# Patient Record
Sex: MALE | Race: WHITE | Employment: UNEMPLOYED | RURAL
[De-identification: names, ages, dates, MRNs, and addresses within clinical notes are randomized per-mention and may not be internally consistent; named-entity substitution may affect disease eponyms.]

---

## 2017-01-06 DIAGNOSIS — I25.709 CORONARY ARTERY DISEASE INVOLVING CORONARY BYPASS GRAFT OF NATIVE HEART WITH ANGINA PECTORIS (HCC): ICD-10-CM

## 2017-01-09 RX ORDER — ATENOLOL 50 MG/1
TABLET ORAL
Qty: 30 TAB | Refills: 4 | Status: SHIPPED | OUTPATIENT
Start: 2017-01-09 | End: 2017-06-05 | Stop reason: SDUPTHER

## 2017-01-16 ENCOUNTER — TELEPHONE (OUTPATIENT)
Dept: FAMILY MEDICINE CLINIC | Age: 62
End: 2017-01-16

## 2017-01-16 DIAGNOSIS — I25.10 CORONARY ARTERY DISEASE DUE TO LIPID RICH PLAQUE: ICD-10-CM

## 2017-01-16 DIAGNOSIS — I10 ESSENTIAL HYPERTENSION: ICD-10-CM

## 2017-01-16 DIAGNOSIS — Z11.59 NEED FOR HEPATITIS C SCREENING TEST: ICD-10-CM

## 2017-01-16 DIAGNOSIS — I25.83 CORONARY ARTERY DISEASE DUE TO LIPID RICH PLAQUE: ICD-10-CM

## 2017-01-16 DIAGNOSIS — I10 ESSENTIAL HYPERTENSION: Primary | ICD-10-CM

## 2017-01-16 DIAGNOSIS — E78.5 HYPERLIPIDEMIA, UNSPECIFIED HYPERLIPIDEMIA TYPE: ICD-10-CM

## 2017-01-16 NOTE — TELEPHONE ENCOUNTER
Patient stated someone called him and told him he needed to come in for labs. There are no labs ordered for him. Do any labs need to be ordered?   Patient has scheduled appointment on 1/18/2017

## 2017-01-24 ENCOUNTER — OFFICE VISIT (OUTPATIENT)
Dept: FAMILY MEDICINE CLINIC | Age: 62
End: 2017-01-24

## 2017-01-24 VITALS
WEIGHT: 237 LBS | DIASTOLIC BLOOD PRESSURE: 68 MMHG | HEART RATE: 59 BPM | TEMPERATURE: 97.9 F | OXYGEN SATURATION: 94 % | SYSTOLIC BLOOD PRESSURE: 134 MMHG | RESPIRATION RATE: 16 BRPM | BODY MASS INDEX: 35.1 KG/M2 | HEIGHT: 69 IN

## 2017-01-24 DIAGNOSIS — I10 ESSENTIAL HYPERTENSION: ICD-10-CM

## 2017-01-24 DIAGNOSIS — E11.9 TYPE 2 DIABETES MELLITUS WITHOUT COMPLICATION, WITHOUT LONG-TERM CURRENT USE OF INSULIN (HCC): Primary | ICD-10-CM

## 2017-01-24 DIAGNOSIS — E78.00 HYPERCHOLESTEREMIA: ICD-10-CM

## 2017-01-24 LAB
ALBUMIN UR QL STRIP: 30 MG/L
BILIRUB UR QL STRIP: NEGATIVE
CREATININE, URINE POC: 300 MG/DL
GLUCOSE UR-MCNC: NORMAL MG/DL
KETONES P FAST UR STRIP-MCNC: NEGATIVE MG/DL
MICROALBUMIN/CREAT RATIO POC: <30 MG/G
PH UR STRIP: 5.5 [PH] (ref 4.6–8)
PROT UR QL STRIP: NEGATIVE MG/DL
SP GR UR STRIP: 1.03 (ref 1–1.03)
UA UROBILINOGEN AMB POC: NORMAL (ref 0.2–1)
URINALYSIS CLARITY POC: NORMAL
URINALYSIS COLOR POC: YELLOW
URINE BLOOD POC: NORMAL
URINE LEUKOCYTES POC: NEGATIVE
URINE NITRITES POC: NEGATIVE

## 2017-01-24 RX ORDER — METFORMIN HYDROCHLORIDE 500 MG/1
1000 TABLET, EXTENDED RELEASE ORAL
Qty: 60 TAB | Refills: 5 | Status: SHIPPED | OUTPATIENT
Start: 2017-01-24 | End: 2017-01-24 | Stop reason: SDUPTHER

## 2017-01-24 RX ORDER — METFORMIN HYDROCHLORIDE 500 MG/1
500 TABLET, EXTENDED RELEASE ORAL
Qty: 60 TAB | Refills: 5 | Status: SHIPPED | OUTPATIENT
Start: 2017-01-24 | End: 2017-09-13 | Stop reason: SDUPTHER

## 2017-01-24 NOTE — PATIENT INSTRUCTIONS

## 2017-01-24 NOTE — PROGRESS NOTES
Reviewed record in preparation for visit and have necessary documentation  Pt did not bring medication to office visit for review  Opportunity was given for questions  Goals that were addressed and/or need to be completed after this appointment include   Health Maintenance Due   Topic Date Due    Hepatitis C Screening  1955    Pneumococcal 19-64 Medium Risk (1 of 1 - PPSV23) 04/20/1973    DTaP/Tdap/Td series (1 - Tdap) 04/20/1975    EYE EXAM RETINAL OR DILATED Q1  12/05/2015    INFLUENZA AGE 9 TO ADULT  08/01/2016    MICROALBUMIN Q1  11/27/2016     - check for functional glucose monitor and record keeping system yes twice weekly  Pt was given BS record log to document home readings and return to office for review  Diabetic Bundle:  LDL-82  A1C-8.5  BP- 147/72  Smoking? No  Anticoagulation medication? Yes  Eye exam dilated?  Due  Foot exam? Completed

## 2017-01-24 NOTE — MR AVS SNAPSHOT
Visit Information Date & Time Provider Department Dept. Phone Encounter #  
 1/24/2017  8:20 AM Nathen Earl  [facility] [phone] [id number] Your Appointments 5/10/2017  1:20 PM  
Any with Bob Lopez MD  
[facility] (ELIE SCHEDULING) Appt Note: 6 MO F/U  
 [address]  [address]  
  
   
 [address] [place] [address] Upcoming Health Maintenance Date Due Hepatitis C Screening 1955 DTaP/Tdap/Td series (1 - Tdap) 4/20/1975 EYE EXAM RETINAL OR DILATED Q1 12/5/2015 INFLUENZA AGE 9 TO ADULT 8/1/2016 MICROALBUMIN Q1 11/27/2016 FOOT EXAM Q1 4/14/2017 HEMOGLOBIN A1C Q6M 6/21/2017 LIPID PANEL Q1 10/14/2017 COLONOSCOPY 12/5/2024 Allergies as of 1/24/2017  Review Complete On: 1/24/2017 By: Georgie Arellano LPN Severity Noted Reaction Type Reactions Percocet [Oxycodone-acetaminophen]  11/11/2014    Swelling Current Immunizations  Never Reviewed No immunizations on file. Not reviewed this visit You Were Diagnosed With   
  
 Codes Comments Type 2 diabetes mellitus without complication, without long-term current use of insulin (HCC)    -  Primary ICD-10-CM: E11.9 ICD-9-CM: 250.00 Essential hypertension     ICD-10-CM: I10 
ICD-9-CM: 401.9 Hypercholesteremia     ICD-10-CM: E78.00 ICD-9-CM: 272.0 Vitals BP Pulse Temp Resp Height(growth percentile) Weight(growth percentile) 134/68 (BP 1 Location: Left arm, BP Patient Position: Sitting) (!) 59 97.9 °F (36.6 °C) (Oral) 16 5' 9\" (1.753 m) 237 lb (107.5 kg) SpO2 BMI Smoking Status 94% 35 kg/m2 Never Smoker Vitals History BMI and BSA Data Body Mass Index Body Surface Area 35 kg/m 2 2.29 m 2 Preferred Pharmacy Pharmacy Name Phone [address], [address] [facility]. [phone] Your Updated Medication List  
  
   
This list is accurate as of: 1/24/17  8:44 AM.  Always use your most recent med list.  
  
  
  
  
 aspirin delayed-release 81 mg tablet Take  by mouth daily. atenolol 50 mg tablet Commonly known as:  TENORMIN  
TAKE 1 TABLET EVERY EVENING. atorvastatin 20 mg tablet Commonly known as:  LIPITOR  
TAKE 1 TABLET NIGHTLY Blood-Glucose Meter monitoring kit Please dispense one Contour meter -diagnosis code 250.00- Check BS once daily. enalapril 10 mg tablet Commonly known as:  VASOTEC  
TAKE 1 TABLET TWICE A DAY  
  
 glucose blood VI test strips strip Commonly known as:  blood glucose test  
Check blood sugar once daily Lancets Misc Check BS once daily  
  
 metFORMIN  mg tablet Commonly known as:  GLUCOPHAGE XR Take 1 Tab by mouth Before breakfast and dinner. nitroglycerin 0.2 mg/hr Commonly known as:  NITRODUR  
1 Patch by TransDERmal route daily. Indications: CHRONIC STABLE ANGINA PECTORIS  
  
 pantoprazole 40 mg tablet Commonly known as:  PROTONIX Take 1 Tab by mouth daily. Indications: GASTROESOPHAGEAL REFLUX  
  
 tamsulosin 0.4 mg capsule Commonly known as:  FLOMAX Take 1 Cap by mouth daily. traMADol 50 mg tablet Commonly known as:  ULTRAM  
Take 1 Tab by mouth every eight (8) hours as needed for Pain. Max Daily Amount: 150 mg.  
  
  
  
  
Prescriptions Sent to Pharmacy Refills  
 metFORMIN ER (GLUCOPHAGE XR) 500 mg tablet 5 Sig: Take 1 Tab by mouth Before breakfast and dinner. Class: Normal  
 Pharmacy: [facility]. Ph #: [phone] Route: Oral  
  
We Performed the Following AMB POC URINALYSIS DIP STICK AUTO W/O MICRO [15249 CPT(R)] AMB POC URINE, MICROALBUMIN, SEMIQUANT (3 RESULTS) [22055 CPT(R)] Patient Instructions Learning About Diabetes Food Guidelines Your Care Instructions Meal planning is important to manage diabetes. It helps keep your blood sugar at a target level (which you set with your doctor). You don't have to eat special foods. You can eat what your family eats, including sweets once in a while. But you do have to pay attention to how often you eat and how much you eat of certain foods. You may want to work with a dietitian or a certified diabetes educator (CDE) to help you plan meals and snacks. A dietitian or CDE can also help you lose weight if that is one of your goals. What should you know about eating carbs? Managing the amount of carbohydrate (carbs) you eat is an important part of healthy meals when you have diabetes. Carbohydrate is found in many foods. · Learn which foods have carbs. And learn the amounts of carbs in different foods. ¨ Bread, cereal, pasta, and rice have about 15 grams of carbs in a serving. A serving is 1 slice of bread (1 ounce), ½ cup of cooked cereal, or 1/3 cup of cooked pasta or rice. ¨ Fruits have 15 grams of carbs in a serving. A serving is 1 small fresh fruit, such as an apple or orange; ½ of a banana; ½ cup of cooked or canned fruit; ½ cup of fruit juice; 1 cup of melon or raspberries; or 2 tablespoons of dried fruit. ¨ Milk and no-sugar-added yogurt have 15 grams of carbs in a serving. A serving is 1 cup of milk or 2/3 cup of no-sugar-added yogurt. ¨ Starchy vegetables have 15 grams of carbs in a serving. A serving is ½ cup of mashed potatoes or sweet potato; 1 cup winter squash; ½ of a small baked potato; ½ cup of cooked beans; or ½ cup cooked corn or green peas. · Learn how much carbs to eat each day and at each meal. A dietitian or CDE can teach you how to keep track of the amount of carbs you eat. This is called carbohydrate counting. · If you are not sure how to count carbohydrate grams, use the Plate Method to plan meals.  It is a good, quick way to make sure that you have a balanced meal. It also helps you spread carbs throughout the day. ¨ Divide your plate by types of foods. Put non-starchy vegetables on half the plate, meat or other protein food on one-quarter of the plate, and a grain or starchy vegetable in the final quarter of the plate. To this you can add a small piece of fruit and 1 cup of milk or yogurt, depending on how many carbs you are supposed to eat at a meal. 
· Try to eat about the same amount of carbs at each meal. Do not \"save up\" your daily allowance of carbs to eat at one meal. 
· Proteins have very little or no carbs per serving. Examples of proteins are beef, chicken, turkey, fish, eggs, tofu, cheese, cottage cheese, and peanut butter. A serving size of meat is 3 ounces, which is about the size of a deck of cards. Examples of meat substitute serving sizes (equal to 1 ounce of meat) are 1/4 cup of cottage cheese, 1 egg, 1 tablespoon of peanut butter, and ½ cup of tofu. How can you eat out and still eat healthy? · Learn to estimate the serving sizes of foods that have carbohydrate. If you measure food at home, it will be easier to estimate the amount in a serving of restaurant food. · If the meal you order has too much carbohydrate (such as potatoes, corn, or baked beans), ask to have a low-carbohydrate food instead. Ask for a salad or green vegetables. · If you use insulin, check your blood sugar before and after eating out to help you plan how much to eat in the future. · If you eat more carbohydrate at a meal than you had planned, take a walk or do other exercise. This will help lower your blood sugar. What else should you know? · Limit saturated fat, such as the fat from meat and dairy products. This is a healthy choice because people who have diabetes are at higher risk of heart disease. So choose lean cuts of meat and nonfat or low-fat dairy products. Use olive or canola oil instead of butter or shortening when cooking. · Don't skip meals. Your blood sugar may drop too low if you skip meals and take insulin or certain medicines for diabetes. · Check with your doctor before you drink alcohol. Alcohol can cause your blood sugar to drop too low. Alcohol can also cause a bad reaction if you take certain diabetes medicines. Follow-up care is a key part of your treatment and safety. Be sure to make and go to all appointments, and call your doctor if you are having problems. It's also a good idea to know your test results and keep a list of the medicines you take. Where can you learn more? Go to http://ben-shavon.info/. Enter L730 in the search box to learn more about \"Learning About Diabetes Food Guidelines. \" Current as of: May 23, 2016 Content Version: 11.1 © 3950-6318 [employer]. Care instructions adapted under license by [employer] (which disclaims liability or warranty for this information). If you have questions about a medical condition or this instruction, always ask your healthcare professional. [place] [address] any warranty or liability for your use of this information. Introducing [facility] & HEALTH SERVICES! Dear Steve Montes De Oca: Thank you for requesting a Trailer[employer] account. Our records indicate that you already have an active Trailer[employer] account. You can access your account anytime at [email or web address] Did you know that you can access your hospital and ER discharge instructions at any time in Trailerpop? You can also review all of your test results from your hospital stay or ER visit. Additional Information If you have questions, please visit the Frequently Asked Questions section of the [employer]iler[employer] website at [email or web address] Remember, Trailerpop is NOT to be used for urgent needs. For medical emergencies, dial 911. Now available from your iPhone and Android! Please provide this summary of care documentation to your next provider. Your primary care clinician is listed as Melissa Mock. If you have any questions after today's visit, please call [phone].

## 2017-01-25 LAB
ALBUMIN SERPL-MCNC: 4.4 G/DL (ref 3.6–4.8)
ALBUMIN/GLOB SERPL: 1.8 {RATIO} (ref 1.1–2.5)
ALP SERPL-CCNC: 142 IU/L (ref 39–117)
ALT SERPL-CCNC: 23 IU/L (ref 0–44)
AST SERPL-CCNC: 17 IU/L (ref 0–40)
BILIRUB SERPL-MCNC: 0.4 MG/DL (ref 0–1.2)
BUN SERPL-MCNC: 13 MG/DL (ref 8–27)
BUN/CREAT SERPL: 16 (ref 10–22)
CALCIUM SERPL-MCNC: 9.5 MG/DL (ref 8.6–10.2)
CHLORIDE SERPL-SCNC: 101 MMOL/L (ref 96–106)
CHOLEST SERPL-MCNC: 163 MG/DL (ref 100–199)
CO2 SERPL-SCNC: 24 MMOL/L (ref 18–29)
CREAT SERPL-MCNC: 0.83 MG/DL (ref 0.76–1.27)
ERYTHROCYTE [DISTWIDTH] IN BLOOD BY AUTOMATED COUNT: 14.3 % (ref 12.3–15.4)
GLOBULIN SER CALC-MCNC: 2.4 G/DL (ref 1.5–4.5)
GLUCOSE SERPL-MCNC: 174 MG/DL (ref 65–99)
HCT VFR BLD AUTO: 40.3 % (ref 37.5–51)
HCV AB S/CO SERPL IA: <0.1 S/CO RATIO (ref 0–0.9)
HDLC SERPL-MCNC: 39 MG/DL
HGB BLD-MCNC: 13.5 G/DL (ref 12.6–17.7)
LDLC SERPL CALC-MCNC: 95 MG/DL (ref 0–99)
MAGNESIUM SERPL-MCNC: 1.9 MG/DL (ref 1.6–2.3)
MCH RBC QN AUTO: 29.6 PG (ref 26.6–33)
MCHC RBC AUTO-ENTMCNC: 33.5 G/DL (ref 31.5–35.7)
MCV RBC AUTO: 88 FL (ref 79–97)
PLATELET # BLD AUTO: 221 X10E3/UL (ref 150–379)
POTASSIUM SERPL-SCNC: 4.7 MMOL/L (ref 3.5–5.2)
PROT SERPL-MCNC: 6.8 G/DL (ref 6–8.5)
RBC # BLD AUTO: 4.56 X10E6/UL (ref 4.14–5.8)
SODIUM SERPL-SCNC: 141 MMOL/L (ref 134–144)
TRIGL SERPL-MCNC: 146 MG/DL (ref 0–149)
TSH SERPL DL<=0.005 MIU/L-ACNC: 1.42 UIU/ML (ref 0.45–4.5)
VLDLC SERPL CALC-MCNC: 29 MG/DL (ref 5–40)
WBC # BLD AUTO: 9.7 X10E3/UL (ref 3.4–10.8)

## 2017-01-30 NOTE — PROGRESS NOTES
Chief Complaint   Patient presents with    Diabetes    Cholesterol Problem    Hypertension    Labs     he is a 57y.o. year old male who presents for follow up of chronic medical conditions. Patient medical history significant for CABG x 3, CAD, HLD, DM2 and HTN. He continues to take just one 500 mg metformin daily though taking it BID has been previously discussed. . BP improved. Compliance a concern. He complains of chest pain. Says this feels like a squeezing across his lower chest/upper abdomen. Patient denies SOB, N/V/D, abdominal pain, paresthesia. BP Readings from Last 3 Encounters:   01/24/17 134/68   12/21/16 152/75   11/07/16 148/76     Wt Readings from Last 3 Encounters:   01/24/17 237 lb (107.5 kg)   12/21/16 238 lb (108 kg)   11/07/16 236 lb 12.8 oz (107.4 kg)     Body mass index is 35 kg/(m^2). Pertinent Labs:   Lab Results   Component Value Date/Time    Hemoglobin A1c 8.5 12/24/2016          Lab Results   Component Value Date/Time    LDL, calculated 95 01/24/2017 09:00 AM         Patient Active Problem List   Diagnosis Code    HTN (hypertension) I10    CAD (coronary artery disease) I25.10    Hypercholesteremia E78.00    DM2 [facility]diabetes mellitus, type 2) ([facility] E11.9    S/P CABG x 3 Z95.1     Past Surgical History   Procedure Laterality Date    Pr cardiac surg procedure unlist      Hx orthopaedic       Social History     Social History    Marital status:      Spouse name: N/A    Number of children: N/A    Years of education: N/A     Occupational History    Not on file.      Social History Main Topics    Smoking status: Never Smoker    Smokeless tobacco: Never Used    Alcohol use No    Drug use: No    Sexual activity: Not on file     Other Topics Concern    Not on file     Social History Narrative     Family History   Problem Relation Age of Onset    Diabetes Mother     Heart Disease Father     Stroke Father      Current Outpatient Prescriptions   Medication Sig    metFORMIN ER (GLUCOPHAGE XR) 500 mg tablet Take 1 Tab by mouth Before breakfast and dinner.  atenolol (TENORMIN) 50 mg tablet TAKE 1 TABLET EVERY EVENING.  pantoprazole (PROTONIX) 40 mg tablet Take 1 Tab by mouth daily. Indications: GASTROESOPHAGEAL REFLUX    traMADol (ULTRAM) 50 mg tablet Take 1 Tab by mouth every eight (8) hours as needed for Pain. Max Daily Amount: 150 mg.    atorvastatin (LIPITOR) 20 mg tablet TAKE 1 TABLET NIGHTLY    nitroglycerin (NITRODUR) 0.2 mg/hr 1 Patch by TransDERmal route daily. Indications: CHRONIC STABLE ANGINA PECTORIS    enalapril (VASOTEC) 10 mg tablet TAKE 1 TABLET TWICE A DAY    aspirin delayed-release 81 mg tablet Take  by mouth daily.  tamsulosin (FLOMAX) 0.4 mg capsule Take 1 Cap by mouth daily.  glucose blood VI test strips (BLOOD GLUCOSE TEST) strip Check blood sugar once daily    Blood-Glucose Meter monitoring kit Please dispense one Contour meter -diagnosis code 250.00- Check BS once daily.  Lancets misc Check BS once daily     No current facility-administered medications for this visit.       Allergies   Allergen Reactions    Percocet [Oxycodone-Acetaminophen] Swelling       Review of Symptoms:  Constitutional: Negative for fatigue, malaise  HEENT: Negative for acute hearing or vision changes  Resp: Negative for cough, wheezing or SOB  Cardiovascular: Positive for chest pain and dizziness   Abdomen: Negative for N/V/D/C or melena  Genital/Urinary: Negative for dysuria, positive for urinary frequency  Musculoskeltal: Negative for myalgias or arthralgias   Neurological: Negative for weakness or paresthesia  Psychological: Negative for depression or anxiety     Vitals:    01/24/17 0815 01/24/17 0820   BP: 147/72 134/68   Pulse: 61 (!) 59   Resp: 16    Temp: 97.9 °F (36.6 °C)    TempSrc: Oral    SpO2: 94%    Weight: 237 lb (107.5 kg)    Height: 5' 9\" (1.753 m)      Physical Examination:  General: obese, in no acute distress  Skin: normal tone and turgor  Head: Normocephalic, atraumatic  Eyes: Sclera clear, EOMI  Neck: Normal range of motion  Respiratory: CTAB with symmetrical, unlabored effort  CV: Normal S1, S2. Regular rate and rhythm, no murmur  Abdomen: obese, normal bowel sounds  Extremities: Full range of motion, no edema  Neurology: Normal strength, no focal deficits  Psych: Active, alert and oriented. Affect appropriate     Health Maintenance Due   Topic Date Due    DTaP/Tdap/Td series (1 - Tdap) 04/20/1975    EYE EXAM RETINAL OR DILATED Q1  12/05/2015    INFLUENZA AGE 9 TO ADULT  08/01/2016     Risk, benefits and potential costs of recommended health maintenance discussed. Patient expressed understanding and declined at this time. Yifan Sethi was seen today for diabetes, cholesterol problem, hypertension and labs. Diagnoses and all orders for this visit:    Type 2 diabetes mellitus without complication, without long-term current use of insulin (HCC)  -     Discontinue: metFORMIN ER (GLUCOPHAGE XR) 500 mg tablet; Take 2 Tabs by mouth Before breakfast and dinner.  -     AMB POC URINALYSIS DIP STICK AUTO W/O MICRO  -     AMB POC URINE, MICROALBUMIN, SEMIQUANT (3 RESULTS)  -     metFORMIN ER (GLUCOPHAGE XR) 500 mg tablet; Take 1 Tab by mouth Before breakfast and dinner. Essential hypertension    Hypercholesteremia      Importance of compliance with all prescribed medications discussed. Discussed expected course and possible complications of diagnoses in detail with patient. Goals of treatment were discussed. All of the patient's questions were addressed. The patient expresses understanding and agreement with our plan of care. The patient has received an after-visit summary and questions were answered concerning future plans. I have discussed medication risks and benefits with the patient as well. Follow-up Disposition:  Return in about 3 months (around 4/24/2017), or if symptoms worsen or fail to improve.

## 2017-01-31 DIAGNOSIS — E11.9 TYPE 2 DIABETES MELLITUS WITHOUT COMPLICATION (HCC): ICD-10-CM

## 2017-01-31 DIAGNOSIS — I10 ESSENTIAL HYPERTENSION: ICD-10-CM

## 2017-01-31 RX ORDER — ENALAPRIL MALEATE 10 MG/1
TABLET ORAL
Qty: 60 TAB | Refills: 3 | Status: SHIPPED | OUTPATIENT
Start: 2017-01-31 | End: 2017-06-05 | Stop reason: SDUPTHER

## 2017-05-10 ENCOUNTER — OFFICE VISIT (OUTPATIENT)
Dept: CARDIOLOGY CLINIC | Age: 62
End: 2017-05-10

## 2017-05-10 VITALS
SYSTOLIC BLOOD PRESSURE: 133 MMHG | BODY MASS INDEX: 37.47 KG/M2 | HEART RATE: 64 BPM | DIASTOLIC BLOOD PRESSURE: 77 MMHG | RESPIRATION RATE: 14 BRPM | WEIGHT: 253 LBS | TEMPERATURE: 98 F | OXYGEN SATURATION: 96 % | HEIGHT: 69 IN

## 2017-05-10 DIAGNOSIS — E78.00 HYPERCHOLESTEREMIA: ICD-10-CM

## 2017-05-10 DIAGNOSIS — Z95.1 S/P CABG X 3: ICD-10-CM

## 2017-05-10 DIAGNOSIS — I25.708 CORONARY ARTERY DISEASE OF BYPASS GRAFT OF NATIVE HEART WITH STABLE ANGINA PECTORIS (HCC): Primary | ICD-10-CM

## 2017-05-10 DIAGNOSIS — I10 ESSENTIAL HYPERTENSION: ICD-10-CM

## 2017-05-10 NOTE — PROGRESS NOTES
JOSE A MARKER      1955   Taras Truong MD  Date of Sajtg-9-87-17    [facility],  PCP=Dennis Gordon Buerger, MD     [facility]. HPI:   Melissa Ornelas is a 60 y.o. male   Subjective:  Mr. Esvin Golden returns today, he was seen in November, had chest pain. We suggested NitroDur. He tried it for a bit and then got better. He says the pills helped him. He's not been taking any for the last three months. He has no chest pain or shortness of breath. Prior history of bypass surgery . Physical Examination:  Unremarkable. Assessment/Plans:  1. Coronary artery disease of bypass graft of native heart with stable angina pectoris ([address]    2. Essential hypertension    3. S/P CABG x 3    4. Hypercholesteremia      Impression/Plan:  Chest pain. Chest pain is now resolved. Continue current medical therapy. Follow up in one year. No future appointments. Cardiac History:   CABG 11-12-14= LIMA to LAD, SVG to OM1,OM2    TTE 11/5/14 LVEF 55 % to 60 %. No WMA. .    Cath 11/11/14 RCA patent stent, mLCX 80, OM1 mid 80 LAD- 85 tubular at ostium EF60%  There was significant 2-vessel coronary artery disease (LAD and  circumflex). Ostial LAD: There was a tubular 85 % stenosis. Mid LAD: There was a discrete 80 % stenosis. Distal LAD: Angiography showed mild atherosclerosis. 1st obtuse marginal: There was a discrete 80 % stenosis at the ostium of  the vessel segment. 2nd obtuse marginal: There was a discrete 50 %  stenosis at the ostium of the vessel segment. Left AV groove artery: There  was a tubular 80 % stenosis at the ostium of the vessel segment. Right PDA: Angiography showed moderate atherosclerosis. CARDIAC STRUCTURES:  Global left ventricular function was normal. EF estimated was 60 %. ROS:Cardiac complete  as above. Respiratory as above with no wheezing or hemoptysis.    He denies  symptoms of unusual weight loss , fevers,  BRBPR, hematuria,  or recent stroke    Past Medical History:   Diagnosis Date    CAD (coronary artery disease)     stent to RCA [place];CABG 11-12-14    DM2 (diabetes mellitus, type 2) ([facility]     Hypercholesteremia     Hypertension       Exam and Labs:  Visit Vitals    /77    Pulse 64    Temp 98 °F (36.7 °C) (Oral)    Resp 14    Ht 5' 9\" (1.753 m)    Wt 253 lb (114.8 kg)    SpO2 96%    BMI 37.36 kg/m2     Constitutional:  NAD, comfortable , moist mucous membranesHENT: Head: NC,ATEyes: No scleral icterus. Neck:  Neck supple. No JVD present. No tracheal deviation,mass  Chest: Effort normal & normal respiratory excursion     Lungs:breath sounds normal. No stridor. distress, wheezes or  Rales. Heart:normal rate, regular rhythm, normal S1, S2, no murmurs, rubs, clicks or gallops , PMI non displaced. Edema: Edema is none. Extremities:  no clubbing or cyanosis. Abdominal:  no abnormal distension. Neurological: alert, conversant and oriented . Skin: Skin is not cold. No obvious systemic rash noted. Not diaphoretic. No erythema. Psychiatric:  Grossly normal mood and affect.   Behavior appears normal.     Lab Results   Component Value Date/Time    Cholesterol, total 163 01/24/2017 09:00 AM    HDL Cholesterol 39 01/24/2017 09:00 AM    LDL, calculated 95 01/24/2017 09:00 AM    Triglyceride 146 01/24/2017 09:00 AM     Lab Results   Component Value Date/Time    Sodium 141 01/24/2017 09:00 AM    Potassium 4.7 01/24/2017 09:00 AM    Chloride 101 01/24/2017 09:00 AM    CO2 24 01/24/2017 09:00 AM    Anion gap 3 11/16/2014 04:07 AM    Glucose 174 01/24/2017 09:00 AM    BUN 13 01/24/2017 09:00 AM    Creatinine 0.83 01/24/2017 09:00 AM    BUN/Creatinine ratio 16 01/24/2017 09:00 AM    GFR est  01/24/2017 09:00 AM    GFR est non-AA 94 01/24/2017 09:00 AM    Calcium 9.5 01/24/2017 09:00 AM      Wt Readings from Last 3 Encounters:   05/10/17 253 lb (114.8 kg)   01/24/17 237 lb (107.5 kg)   12/21/16 238 lb (108 kg)      BP Readings from Last 3 Encounters:   05/10/17 133/77   01/24/17 134/68   12/21/16 152/75        Current Outpatient Prescriptions   Medication Sig    enalapril (VASOTEC) 10 mg tablet TAKE 1 TABLET TWICE A DAY    metFORMIN ER (GLUCOPHAGE XR) 500 mg tablet Take 1 Tab by mouth Before breakfast and dinner.  atenolol (TENORMIN) 50 mg tablet TAKE 1 TABLET EVERY EVENING.  pantoprazole (PROTONIX) 40 mg tablet Take 1 Tab by mouth daily. Indications: GASTROESOPHAGEAL REFLUX    traMADol (ULTRAM) 50 mg tablet Take 1 Tab by mouth every eight (8) hours as needed for Pain. Max Daily Amount: 150 mg.    atorvastatin (LIPITOR) 20 mg tablet TAKE 1 TABLET NIGHTLY    nitroglycerin (NITRODUR) 0.2 mg/hr 1 Patch by TransDERmal route daily. Indications: CHRONIC STABLE ANGINA PECTORIS    aspirin delayed-release 81 mg tablet Take  by mouth daily.  tamsulosin (FLOMAX) 0.4 mg capsule Take 1 Cap by mouth daily.  glucose blood VI test strips (BLOOD GLUCOSE TEST) strip Check blood sugar once daily    Blood-Glucose Meter monitoring kit Please dispense one Contour meter -diagnosis code 250.00- Check BS once daily.  Lancets misc Check BS once daily     No current facility-administered medications for this visit. Past Surgical History:   Procedure Laterality Date    CARDIAC SURG PROCEDURE UNLIST      HX ORTHOPAEDIC        Social Hx=  reports that he has never smoked. He has never used smokeless tobacco. He reports that he does not drink alcohol or use illicit drugs. Family Hx= family history includes Diabetes in his mother; Heart Disease in his father; Stroke in his father. Impression see above.

## 2017-05-10 NOTE — PROGRESS NOTES
Reviewed record in preparation for visit and have necessary documentation      Body mass index is 37.36 kg/(m^2).     Health Maintenance Due   Topic Date Due    DTaP/Tdap/Td series (1 - Tdap) 04/20/1975    EYE EXAM RETINAL OR DILATED Q1  12/05/2015    FOOT EXAM Q1  04/14/2017

## 2017-06-05 DIAGNOSIS — I10 ESSENTIAL HYPERTENSION: ICD-10-CM

## 2017-06-05 DIAGNOSIS — I25.709 CORONARY ARTERY DISEASE INVOLVING CORONARY BYPASS GRAFT OF NATIVE HEART WITH ANGINA PECTORIS (HCC): ICD-10-CM

## 2017-06-05 DIAGNOSIS — E11.9 TYPE 2 DIABETES MELLITUS WITHOUT COMPLICATION (HCC): ICD-10-CM

## 2017-06-05 DIAGNOSIS — E78.00 HYPERCHOLESTEREMIA: ICD-10-CM

## 2017-06-05 RX ORDER — ENALAPRIL MALEATE 10 MG/1
TABLET ORAL
Qty: 60 TAB | Refills: 11 | Status: SHIPPED | OUTPATIENT
Start: 2017-06-05 | End: 2018-06-04 | Stop reason: SDUPTHER

## 2017-06-05 RX ORDER — ATENOLOL 50 MG/1
TABLET ORAL
Qty: 30 TAB | Refills: 11 | Status: SHIPPED | OUTPATIENT
Start: 2017-06-05 | End: 2018-05-24 | Stop reason: SDUPTHER

## 2017-06-05 NOTE — TELEPHONE ENCOUNTER
Requested Prescriptions     Signed Prescriptions Disp Refills    enalapril (VASOTEC) 10 mg tablet 60 Tab 11     Sig: TAKE 1 TABLET TWICE A DAY     Authorizing Provider: Derek Nance     Ordering User: HAN MILIAN    atenolol (TENORMIN) 50 mg tablet 30 Tab 11     Sig: TAKE 1 TABLET EVERY EVENING. Authorizing Provider: Derek Lopez User: Inez Wick     Verbal order per Dr. Charles Banerjee.      To schedule follow up in 1 year.

## 2017-06-07 DIAGNOSIS — E78.00 HYPERCHOLESTEREMIA: ICD-10-CM

## 2017-06-07 RX ORDER — ATORVASTATIN CALCIUM 20 MG/1
TABLET, FILM COATED ORAL
Qty: 30 TAB | Refills: 0 | Status: SHIPPED | OUTPATIENT
Start: 2017-06-07 | End: 2017-06-07 | Stop reason: SDUPTHER

## 2017-06-07 RX ORDER — ATORVASTATIN CALCIUM 20 MG/1
TABLET, FILM COATED ORAL
Qty: 30 TAB | Refills: 12 | Status: SHIPPED | OUTPATIENT
Start: 2017-06-07 | End: 2017-06-29 | Stop reason: SDUPTHER

## 2017-06-29 DIAGNOSIS — E78.00 HYPERCHOLESTEREMIA: ICD-10-CM

## 2017-06-29 RX ORDER — ATORVASTATIN CALCIUM 20 MG/1
TABLET, FILM COATED ORAL
Qty: 30 TAB | Refills: 3 | Status: SHIPPED | OUTPATIENT
Start: 2017-06-29 | End: 2017-11-21 | Stop reason: SDUPTHER

## 2017-06-29 NOTE — TELEPHONE ENCOUNTER
Refill request received from Simone Madsen, on HIPAA. Last office visit was 1/24/17 and has one scheduled for 7/11/17. He will run out before his appointment. Crystal can be reached at [phone].

## 2017-07-11 ENCOUNTER — OFFICE VISIT (OUTPATIENT)
Dept: FAMILY MEDICINE CLINIC | Age: 62
End: 2017-07-11

## 2017-07-11 VITALS
SYSTOLIC BLOOD PRESSURE: 145 MMHG | BODY MASS INDEX: 34.66 KG/M2 | TEMPERATURE: 98 F | OXYGEN SATURATION: 95 % | RESPIRATION RATE: 20 BRPM | HEIGHT: 69 IN | WEIGHT: 234 LBS | DIASTOLIC BLOOD PRESSURE: 77 MMHG | HEART RATE: 68 BPM

## 2017-07-11 DIAGNOSIS — I25.708 CORONARY ARTERY DISEASE OF BYPASS GRAFT OF NATIVE HEART WITH STABLE ANGINA PECTORIS (HCC): ICD-10-CM

## 2017-07-11 DIAGNOSIS — E11.9 TYPE 2 DIABETES MELLITUS WITHOUT COMPLICATION, WITHOUT LONG-TERM CURRENT USE OF INSULIN (HCC): Primary | ICD-10-CM

## 2017-07-11 DIAGNOSIS — I10 ESSENTIAL HYPERTENSION: ICD-10-CM

## 2017-07-11 DIAGNOSIS — E78.00 HYPERCHOLESTEREMIA: ICD-10-CM

## 2017-07-11 LAB — HBA1C MFR BLD HPLC: 8.1 %

## 2017-07-11 NOTE — MR AVS SNAPSHOT
Visit Information Date & Time Provider Department Dept. Phone Encounter #  
 7/11/2017  8:00 AM Yeny Daigle MD [address] [id number] Follow-up Instructions Return in about 4 months (around 11/11/2017). Upcoming Health Maintenance Date Due DTaP/Tdap/Td series (1 - Tdap) 4/20/1975 EYE EXAM RETINAL OR DILATED Q1 12/5/2015 FOOT EXAM Q1 4/14/2017 HEMOGLOBIN A1C Q6M 6/21/2017 INFLUENZA AGE 9 TO ADULT 8/1/2017 MICROALBUMIN Q1 1/24/2018 LIPID PANEL Q1 1/24/2018 COLONOSCOPY 12/5/2024 Allergies as of 7/11/2017  Review Complete On: 7/11/2017 By: Yeny Daigle MD  
  
 Severity Noted Reaction Type Reactions Percocet [Oxycodone-acetaminophen]  11/11/2014    Swelling Current Immunizations  Never Reviewed No immunizations on file. Not reviewed this visit You Were Diagnosed With   
  
 Codes Comments Type 2 diabetes mellitus without complication, without long-term current use of insulin ([facility])    -  Primary ICD-10-CM: E11.9 ICD-9-CM: 250.00 Essential hypertension     ICD-10-CM: I10 
ICD-9-CM: 401.9 Hypercholesteremia     ICD-10-CM: E78.00 ICD-9-CM: 272.0 Coronary artery disease of bypass graft of native heart with stable angina pectoris ([facility]     ICD-10-CM: I25.708 ICD-9-CM: 414.05, 413.9 Vitals BP Pulse Temp Resp Height(growth percentile) Weight(growth percentile) 145/77 68 98 °F (36.7 °C) (Oral) 20 5' 9\" (1.753 m) 234 lb (106.1 kg) SpO2 BMI Smoking Status 95% 34.56 kg/m2 Never Smoker Vitals History BMI and BSA Data Body Mass Index Body Surface Area 34.56 kg/m 2 2.27 m 2 Preferred Pharmacy Pharmacy Name Phone [address], [address] [facility]. [phone] Your Updated Medication List  
  
   
This list is accurate as of: 7/11/17  8:43 AM.  Always use your most recent med list.  
  
  
  
  
 aspirin delayed-release 81 mg tablet Take  by mouth daily. atenolol 50 mg tablet Commonly known as:  TENORMIN  
TAKE 1 TABLET EVERY EVENING. atorvastatin 20 mg tablet Commonly known as:  LIPITOR  
TAKE 1 TABLET NIGHTLY Blood-Glucose Meter monitoring kit Please dispense one Contour meter -diagnosis code 250.00- Check BS once daily. enalapril 10 mg tablet Commonly known as:  VASOTEC  
TAKE 1 TABLET TWICE A DAY  
  
 glucose blood VI test strips strip Commonly known as:  blood glucose test  
Check blood sugar once daily Lancets Misc Check BS once daily  
  
 metFORMIN  mg tablet Commonly known as:  GLUCOPHAGE XR Take 1 Tab by mouth Before breakfast and dinner. nitroglycerin 0.2 mg/hr Commonly known as:  NITRODUR  
1 Patch by TransDERmal route daily. Indications: CHRONIC STABLE ANGINA PECTORIS  
  
 pantoprazole 40 mg tablet Commonly known as:  PROTONIX Take 1 Tab by mouth daily. Indications: GASTROESOPHAGEAL REFLUX  
  
 traMADol 50 mg tablet Commonly known as:  ULTRAM  
Take 1 Tab by mouth every eight (8) hours as needed for Pain. Max Daily Amount: 150 mg. We Performed the Following HEMOGLOBIN A1C WITH EAG [19995 CPT(R)] HGB & HCT [85522 CPT(R)] LIPID PANEL [24205 CPT(R)] METABOLIC PANEL, COMPREHENSIVE [52501 CPT(R)] TSH 3RD GENERATION [81234 CPT(R)] Follow-up Instructions Return in about 4 months (around 11/11/2017). Patient Instructions Learning About Diabetes Food Guidelines Your Care Instructions Meal planning is important to manage diabetes. It helps keep your blood sugar at a target level (which you set with your doctor). You don't have to eat special foods. You can eat what your family eats, including sweets once in a while. But you do have to pay attention to how often you eat and how much you eat of certain foods. You may want to work with a dietitian or a certified diabetes educator (CDE) to help you plan meals and snacks. A dietitian or CDE can also help you lose weight if that is one of your goals. What should you know about eating carbs? Managing the amount of carbohydrate (carbs) you eat is an important part of healthy meals when you have diabetes. Carbohydrate is found in many foods. · Learn which foods have carbs. And learn the amounts of carbs in different foods. ¨ Bread, cereal, pasta, and rice have about 15 grams of carbs in a serving. A serving is 1 slice of bread (1 ounce), ½ cup of cooked cereal, or 1/3 cup of cooked pasta or rice. ¨ Fruits have 15 grams of carbs in a serving. A serving is 1 small fresh fruit, such as an apple or orange; ½ of a banana; ½ cup of cooked or canned fruit; ½ cup of fruit juice; 1 cup of melon or raspberries; or 2 tablespoons of dried fruit. ¨ Milk and no-sugar-added yogurt have 15 grams of carbs in a serving. A serving is 1 cup of milk or 2/3 cup of no-sugar-added yogurt. ¨ Starchy vegetables have 15 grams of carbs in a serving. A serving is ½ cup of mashed potatoes or sweet potato; 1 cup winter squash; ½ of a small baked potato; ½ cup of cooked beans; or ½ cup cooked corn or green peas. · Learn how much carbs to eat each day and at each meal. A dietitian or CDE can teach you how to keep track of the amount of carbs you eat. This is called carbohydrate counting. · If you are not sure how to count carbohydrate grams, use the Plate Method to plan meals. It is a good, quick way to make sure that you have a balanced meal. It also helps you spread carbs throughout the day. ¨ Divide your plate by types of foods. Put non-starchy vegetables on half the plate, meat or other protein food on one-quarter of the plate, and a grain or starchy vegetable in the final quarter of the plate.  To this you can add a small piece of fruit and 1 cup of milk or yogurt, depending on how many carbs you are supposed to eat at a meal. 
· Try to eat about the same amount of carbs at each meal. Do not \"save up\" your daily allowance of carbs to eat at one meal. 
· Proteins have very little or no carbs per serving. Examples of proteins are beef, chicken, turkey, fish, eggs, tofu, cheese, cottage cheese, and peanut butter. A serving size of meat is 3 ounces, which is about the size of a deck of cards. Examples of meat substitute serving sizes (equal to 1 ounce of meat) are 1/4 cup of cottage cheese, 1 egg, 1 tablespoon of peanut butter, and ½ cup of tofu. How can you eat out and still eat healthy? · Learn to estimate the serving sizes of foods that have carbohydrate. If you measure food at home, it will be easier to estimate the amount in a serving of restaurant food. · If the meal you order has too much carbohydrate (such as potatoes, corn, or baked beans), ask to have a low-carbohydrate food instead. Ask for a salad or green vegetables. · If you use insulin, check your blood sugar before and after eating out to help you plan how much to eat in the future. · If you eat more carbohydrate at a meal than you had planned, take a walk or do other exercise. This will help lower your blood sugar. What else should you know? · Limit saturated fat, such as the fat from meat and dairy products. This is a healthy choice because people who have diabetes are at higher risk of heart disease. So choose lean cuts of meat and nonfat or low-fat dairy products. Use olive or canola oil instead of butter or shortening when cooking. · Don't skip meals. Your blood sugar may drop too low if you skip meals and take insulin or certain medicines for diabetes. · Check with your doctor before you drink alcohol. Alcohol can cause your blood sugar to drop too low. Alcohol can also cause a bad reaction if you take certain diabetes medicines. Follow-up care is a key part of your treatment and safety.  Be sure to make and go to all appointments, and call your doctor if you are having problems. It's also a good idea to know your test results and keep a list of the medicines you take. Where can you learn more? Go to http://ben-shavon.info/. Enter J034 in the search box to learn more about \"Learning About Diabetes Food Guidelines. \" Current as of: March 13, 2017 Content Version: 11.3 © 0519-3470 [employer]. Care instructions adapted under license by [employer] (which disclaims liability or warranty for this information). If you have questions about a medical condition or this instruction, always ask your healthcare professional. [address] any warranty or liability for your use of this information. Introducing [facility]! Dear Tennille Singh: Thank you for requesting a Reward[employer] account. Our records indicate that you already have an active Reward[employer] account. You can access your account anytime at [email or web address]. [employer] Did you know that you can access your hospital and ER discharge instructions at any time in Reward[employer]? You can also review all of your test results from your hospital stay or ER visit. Additional Information If you have questions, please visit the Frequently Asked Questions section of the Rew[employer] website at [email or web address] Remember, [employer] is NOT to be used for urgent needs. For medical emergencies, dial 911. Now available from your iPhone and Android! Please provide this summary of care documentation to your next provider. Your primary care clinician is listed as Lenora Marie. If you have any questions after today's visit, please call [phone].

## 2017-07-11 NOTE — PATIENT INSTRUCTIONS

## 2017-07-11 NOTE — PROGRESS NOTES
Chief Complaint   Patient presents with    Hypertension    Diabetes    Cholesterol Problem    Labs    Annual Wellness Visit     he is a 60y.o. year old male who presents for follow up of chronic medical conditions. Patient medical history significant for CABG x 3, CAD, HLD, DM2 and HTN. Medication compliance a concern. Patient denies HA, dizziness, SOB, CP, abdominal pain, dysuria, myalgias or arthralgias. Diabetes: This patient is being treating under a comprehensive plan of care for diabetes. Overall the patient feels well with good energy level. Insulin dependence: no   Pertinent Labs:     Lab Results   Component Value Date/Time    Hemoglobin A1c 8.1 10/14/2016 08:58 AM      Body mass index is 34.56 kg/(m^2). Lab Results   Component Value Date/Time    LDL, calculated 95 01/24/2017 09:00 AM           Wt Readings from Last 3 Encounters:   07/11/17 234 lb (106.1 kg)   05/10/17 253 lb (114.8 kg)   01/24/17 237 lb (107.5 kg)        History   Smoking Status    Never Smoker   Smokeless Tobacco    Never Used        Medications, diet and exercise as means of diabetic control with a goal of an A1C of less than 7.0% discussed. Diabetic foot care and annual eye exam discussed as well. Check blood sugars while fasting just before breakfast on most days and occasionally before dinner. Write down readings in a diabetic log book and bring them to the next visit. Call the office for fasting sugars over 200 or below 75 on two or more occasions. Call immediately if having symptoms of high sugar (frequent urination, always thirsty) or low sugar (dizzy, lethargic, sweaty, nauseated, headache). Our overall goal is to reduce or eliminate the long term consequences of poorly controlled diabetes. Patient expresses understanding and agreement with our plan of care.     Hypertension:  The patient reports:  taking medications as instructed, no medication side effects noted, no TIA's, no chest pain on exertion, no dyspnea on exertion, no swelling of ankles. BP Readings from Last 3 Encounters:   07/11/17 145/77   05/10/17 133/77   01/24/17 134/68     Lab Results   Component Value Date/Time    Sodium 141 01/24/2017 09:00 AM    Potassium 4.7 01/24/2017 09:00 AM    Chloride 101 01/24/2017 09:00 AM    CO2 24 01/24/2017 09:00 AM    Anion gap 3 11/16/2014 04:07 AM    Glucose 174 01/24/2017 09:00 AM    BUN 13 01/24/2017 09:00 AM    Creatinine 0.83 01/24/2017 09:00 AM    BUN/Creatinine ratio 16 01/24/2017 09:00 AM    GFR est  01/24/2017 09:00 AM    GFR est non-AA 94 01/24/2017 09:00 AM    Calcium 9.5 01/24/2017 09:00 AM     Patient advised to log blood pressures at home 2-3 times weekly and bring to next visit. Call office as soon as possible if BP's over 140/90 on multiple occasions or with symptoms of dizziness, chest pain, shortness of breath, headache or ankle swelling. Our goal is to normalize the blood pressure to decrease the risks of strokes and heart attacks. The patient is in agreement with the plan. Patient Active Problem List   Diagnosis Code    HTN (hypertension) I10    CAD (coronary artery disease) I25.10    Hypercholesteremia E78.00    DM2 (diabetes mellitus, type 2) ([facility] E11.9    S/P CABG x 3 Z95.1    Type 2 diabetes mellitus without complication ([facility] A67.3     Past Surgical History:   Procedure Laterality Date    CARDIAC SURG PROCEDURE UNLIST      HX ORTHOPAEDIC       Social History     Social History    Marital status:      Spouse name: N/A    Number of children: N/A    Years of education: N/A     Occupational History    Not on file.      Social History Main Topics    Smoking status: Never Smoker    Smokeless tobacco: Never Used    Alcohol use No    Drug use: No    Sexual activity: Not on file     Other Topics Concern    Not on file     Social History Narrative     Family History   Problem Relation Age of Onset    Diabetes Mother     Heart Disease Father     Stroke Father      Current Outpatient Prescriptions   Medication Sig    atorvastatin (LIPITOR) 20 mg tablet TAKE 1 TABLET NIGHTLY    enalapril (VASOTEC) 10 mg tablet TAKE 1 TABLET TWICE A DAY    atenolol (TENORMIN) 50 mg tablet TAKE 1 TABLET EVERY EVENING.  metFORMIN ER (GLUCOPHAGE XR) 500 mg tablet Take 1 Tab by mouth Before breakfast and dinner.  pantoprazole (PROTONIX) 40 mg tablet Take 1 Tab by mouth daily. Indications: GASTROESOPHAGEAL REFLUX    nitroglycerin (NITRODUR) 0.2 mg/hr 1 Patch by TransDERmal route daily. Indications: CHRONIC STABLE ANGINA PECTORIS    aspirin delayed-release 81 mg tablet Take  by mouth daily.  glucose blood VI test strips (BLOOD GLUCOSE TEST) strip Check blood sugar once daily    Blood-Glucose Meter monitoring kit Please dispense one Contour meter -diagnosis code 250.00- Check BS once daily.  Lancets misc Check BS once daily    traMADol (ULTRAM) 50 mg tablet Take 1 Tab by mouth every eight (8) hours as needed for Pain. Max Daily Amount: 150 mg. No current facility-administered medications for this visit.       Allergies   Allergen Reactions    Percocet [Oxycodone-Acetaminophen] Swelling       Review of Symptoms:  Constitutional: Negative for fatigue, malaise  HEENT: Negative for acute hearing or vision changes  Resp: Negative for cough, wheezing or SOB  Cardiovascular: Positive for chest pain and dizziness   Abdomen: Negative for N/V/D/C or melena  Genital/Urinary: Negative for dysuria, positive for urinary frequency  Musculoskeltal: Negative for myalgias or arthralgias   Neurological: Negative for weakness or paresthesia  Psychological: Negative for depression or anxiety     Vitals:    07/11/17 0808   BP: 145/77   Pulse: 68   Resp: 20   Temp: 98 °F (36.7 °C)   TempSrc: Oral   SpO2: 95%   Weight: 234 lb (106.1 kg)   Height: 5' 9\" (1.753 m)     Physical Examination:  General: obese, in no acute distress  Skin: normal tone and turgor  Head: Normocephalic, atraumatic  Eyes: Sclera clear, EOMI  Neck: Normal range of motion  Respiratory: CTAB with symmetrical, unlabored effort  CV: Normal S1, S2. Regular rate and rhythm, no murmur  Abdomen: obese, normal bowel sounds  Extremities: Full range of motion, no edema  Feet: normal sensation, no lesions  Neurology: Normal strength, no focal deficits  Psych: Active, alert and oriented. Affect appropriate     Health Maintenance Due   Topic Date Due    DTaP/Tdap/Td series (1 - Tdap) 04/20/1975    EYE EXAM RETINAL OR DILATED Q1  12/05/2015    FOOT EXAM Q1  04/14/2017     Risk, benefits and potential costs of recommended health maintenance discussed. Patient expressed understanding and declined at this time. Carl Moran was seen today for hypertension, diabetes, cholesterol problem, labs and annual wellness visit. Diagnoses and all orders for this visit:    Type 2 diabetes mellitus without complication, without long-term current use of insulin (HCC)  -     METABOLIC PANEL, COMPREHENSIVE  -     Cancel: HEMOGLOBIN A1C WITH EAG  -     LIPID PANEL  -     HGB & HCT  -     AMB POC HEMOGLOBIN A1C  -     COLLECTION CAPILLARY BLOOD SPECIMEN  -      DIABETES FOOT EXAM    Essential hypertension  -     METABOLIC PANEL, COMPREHENSIVE  -     TSH 3RD GENERATION    Hypercholesteremia  -     METABOLIC PANEL, COMPREHENSIVE  -     LIPID PANEL  -     AMB POC HEMOGLOBIN A1C  -     COLLECTION CAPILLARY BLOOD SPECIMEN    Coronary artery disease of bypass graft of native heart with stable angina pectoris (HCC)  -     AMB POC HEMOGLOBIN A1C  -     COLLECTION CAPILLARY BLOOD SPECIMEN      Importance of compliance with all prescribed medications discussed. Discussed expected course and possible complications of diagnoses in detail with patient. Goals of treatment were discussed. All of the patient's questions were addressed. The patient expresses understanding and agreement with our plan of care.   The patient has received an after-visit summary and questions were answered concerning future plans. I have discussed medication risks and benefits with the patient as well. Follow-up Disposition:  Return in about 4 months (around 11/11/2017).

## 2017-07-11 NOTE — PROGRESS NOTES
Health Maintenance Due   Topic Date Due    DTaP/Tdap/Td series (1 - Tdap) 04/20/1975    EYE EXAM RETINAL OR DILATED Q1  12/05/2015    FOOT EXAM Q1  04/14/2017    HEMOGLOBIN A1C Q6M  06/21/2017       Diabetic Bundle:  LDL-95  A1C-8.5  BP-  Smoking?no  Anticoagulation medication?  yes  Eye exam dilated?due  Foot exam?due

## 2017-12-20 DIAGNOSIS — E11.9 TYPE 2 DIABETES MELLITUS WITHOUT COMPLICATION, WITHOUT LONG-TERM CURRENT USE OF INSULIN (HCC): ICD-10-CM

## 2017-12-21 RX ORDER — METFORMIN HYDROCHLORIDE 500 MG/1
TABLET, EXTENDED RELEASE ORAL
Qty: 60 TAB | Refills: 0 | Status: SHIPPED | OUTPATIENT
Start: 2017-12-21

## 2018-05-24 ENCOUNTER — OFFICE VISIT (OUTPATIENT)
Dept: CARDIOLOGY CLINIC | Age: 63
End: 2018-05-24

## 2018-05-24 VITALS
SYSTOLIC BLOOD PRESSURE: 138 MMHG | HEIGHT: 69 IN | RESPIRATION RATE: 16 BRPM | OXYGEN SATURATION: 98 % | BODY MASS INDEX: 34.66 KG/M2 | WEIGHT: 234 LBS | HEART RATE: 71 BPM | DIASTOLIC BLOOD PRESSURE: 80 MMHG

## 2018-05-24 DIAGNOSIS — I10 ESSENTIAL HYPERTENSION: ICD-10-CM

## 2018-05-24 DIAGNOSIS — I25.709 CORONARY ARTERY DISEASE INVOLVING CORONARY BYPASS GRAFT OF NATIVE HEART WITH ANGINA PECTORIS (HCC): Primary | ICD-10-CM

## 2018-05-24 DIAGNOSIS — Z95.1 S/P CABG X 3: ICD-10-CM

## 2018-05-24 DIAGNOSIS — E78.00 HYPERCHOLESTEREMIA: ICD-10-CM

## 2018-05-24 DIAGNOSIS — I25.708 CORONARY ARTERY DISEASE OF BYPASS GRAFT OF NATIVE HEART WITH STABLE ANGINA PECTORIS (HCC): ICD-10-CM

## 2018-05-24 DIAGNOSIS — I73.9 CLAUDICATION (HCC): ICD-10-CM

## 2018-05-24 RX ORDER — NITROGLYCERIN 40 MG/1
1 PATCH TRANSDERMAL DAILY
Qty: 30 PATCH | Refills: 5 | Status: SHIPPED | OUTPATIENT
Start: 2018-05-24 | End: 2018-06-04 | Stop reason: SDUPTHER

## 2018-05-24 RX ORDER — ATENOLOL 100 MG/1
100 TABLET ORAL EVERY EVENING
Qty: 30 TAB | Refills: 5 | Status: SHIPPED | OUTPATIENT
Start: 2018-05-24 | End: 2018-08-20 | Stop reason: SDUPTHER

## 2018-05-24 NOTE — MR AVS SNAPSHOT
[address] [facility] [address] [place] [address] 
[phone] Patient: Lacy Zimmerman MRN: [id number] DKX:[id number] Visit Information Date & Time Provider Department Dept. Phone Encounter #  
 5/24/2018  4:00 PM Hunter Farrell MD [facility] [phone] [id number] Upcoming Health Maintenance Date Due DTaP/Tdap/Td series (1 - Tdap) 4/20/1975 EYE EXAM RETINAL OR DILATED Q1 12/5/2015 HEMOGLOBIN A1C Q6M 1/11/2018 MICROALBUMIN Q1 1/24/2018 LIPID PANEL Q1 1/24/2018 MEDICARE YEARLY EXAM 3/14/2018 FOOT EXAM Q1 7/19/2018 Influenza Age 5 to Adult 8/1/2018 COLONOSCOPY 12/5/2024 Allergies as of 5/24/2018  Review Complete On: 5/24/2018 By: Leticia Still Severity Noted Reaction Type Reactions Percocet [Oxycodone-acetaminophen]  11/11/2014    Swelling Current Immunizations  Never Reviewed No immunizations on file. Not reviewed this visit You Were Diagnosed With   
  
 Codes Comments S/P CABG x 3    -  Primary ICD-10-CM: Z95.1 ICD-9-CM: V45.81 Coronary artery disease involving coronary bypass graft of native heart with angina pectoris ([facility]     ICD-10-CM: I25.709 ICD-9-CM: 414.05, 413.9 Essential hypertension     ICD-10-CM: I10 
ICD-9-CM: 401.9 Coronary artery disease of bypass graft of native heart with stable angina pectoris ([facility]     ICD-10-CM: I25.708 ICD-9-CM: 414.05, 413.9 [facility])     ICD-10-CM: I73.9 ICD-9-CM: 443. 9 Vitals BP Pulse Resp Height(growth percentile) Weight(growth percentile) SpO2  
 138/80 (BP 1 Location: Left arm, BP Patient Position: Sitting) 71 16 5' 9\" (1.753 m) 234 lb (106.1 kg) 98% BMI Smoking Status 34.56 kg/m2 Never Smoker BMI and BSA Data Body Mass Index Body Surface Area 34.56 kg/m 2 2.27 m 2 Preferred Pharmacy Pharmacy Name Phone [address] [facility], [address] [facility]. [phone] Your Updated Medication List  
  
   
This list is accurate as of 18  4:39 PM.  Always use your most recent med list.  
  
  
  
  
 aspirin delayed-release 81 mg tablet Take  by mouth daily. atenolol 100 mg tablet Commonly known as:  TENORMIN Take 1 Tab by mouth every evening. atorvastatin 20 mg tablet Commonly known as:  LIPITOR  
TAKE 1 TABLET NIGHTLY. Blood-Glucose Meter monitoring kit Please dispense one Contour meter -diagnosis code 250.00- Check BS once daily. enalapril 10 mg tablet Commonly known as:  VASOTEC  
TAKE 1 TABLET TWICE A DAY  
  
 glucose blood VI test strips strip Commonly known as:  blood glucose test  
Check blood sugar once daily Lancets Misc Check BS once daily  
  
 metFORMIN  mg tablet Commonly known as:  GLUCOPHAGE XR  
TAKE 1 TABLET BEFORE BREAKFAST & DINNER  
  
 nitroglycerin 0.2 mg/hr Commonly known as:  NITRODUR  
1 Patch by TransDERmal route daily. Indications: Chronic Stable Angina Pectoris Prescriptions Sent to Pharmacy Refills  
 atenolol (TENORMIN) 100 mg tablet 5 Sig: Take 1 Tab by mouth every evening. Class: Normal  
 Pharmacy: [place] [address]. Ph #: [phone] Route: Oral  
 nitroglycerin (NITRODUR) 0.2 mg/hr 5 Si Patch by TransDERmal route daily. Indications: Chronic Stable Angina Pectoris Class: Normal  
 Pharmacy: [place] [address]. Ph #: [phone] Route: TransDERmal  
  
We Performed the Following AMB POC EKG ROUTINE W/ 12 LEADS, INTER & REP [81552 CPT(R)] To-Do List   
 2018 Imaging:  ANKLE BRACHIAL INDEX Patient Instructions You will need to follow up in clinic with Dr. Pam Niño in 2-3 weeks with JOSE JUAN study. There were changes made to your medications at this appointment. Please note the following: INCREASE atenolol to 100 mg, daily. It is okay to double up with the 50 mg tablet that you have at home to finish what you already have. An updated script has been sent to your pharmacy so your refill will be a 100 mg tablet. Introducing [facility]! Dear Jimi Mcgrath: Thank you for requesting a [email or web address] account. Our records indicate that you already have an active [email or web address] account. You can access your account anytime at [email or web address]. "[email or web address] Did you know that you can access your hospital and ER discharge instructions at any time in [email or web address]? You can also review all of your test results from your hospital stay or ER visit. Additional Information If you have questions, please visit the Frequently Asked Questions section of the [email or web address] website at [email or web address] Remember, [email or web address] is NOT to be used for urgent needs. For medical emergencies, dial 911. Now available from your iPhone and Android! Please provide this summary of care documentation to your next provider. Your primary care clinician is listed as Basil Dickerson. If you have any questions after today's visit, please call [phone].

## 2018-05-24 NOTE — PROGRESS NOTES
Broderick Madrigal Marker     1955       Palma Lezama MD, [facility] [place]  Date of Visit-5/24/2018   PCP is Yovana Pelletier MD   [facility] [place]  [facility]  HPI:  Myriam Mendoza is a 58 y.o. male   Follow up of CABG in 2014, last seen one year ago. The pt states that he has been having some chest pain. He reports that when he walks short distances his legs start to give out and he gets pain in the back of his calves. The pt states that this pain doesn't worsen when he exerts himself. He reports that this is happening everyday. The pt states that this has been going on for the last 4-5 months. He has not tried using any nitro to alleviate this pain. The pt does not smoke or drink alcohol. He states that he is taking all his medications as prescribed. The pt states that he was using nitro patches previously and those worked well for him. He is planning on seeing pulmonary soon due to a nodule in his lung. Denies edema, syncope, has no tachycardia, palpitations or sense of arrhythmia. EKG: Sinus  Rhythm -  Negative precordial T-waves  -May be normal -consider anteroseptal ischemia. Assessment/Plan:     1. Increasing angina, has not been using much nitro and is no longer on nitro patch. This is for the last several months I think represents unstable angina. 2. He is hesitant for a cath, I am going to increase his atenolol and add back nitro patch. 3. Will see him in 2 weeks. If the symptoms don't resolve he should consider heart cath and angioplasty. 4 . Claudication, JOSE JUAN's and PVRs in 2 weeks when he returns. He is already on aspirin and a statin. May benefit from plavix or xarelto. 5. HTN   BP Readings from Last 3 Encounters:   05/24/18 138/80   07/11/17 145/77   05/10/17 133/77    6 Lipids on high potency statin as appropriate for secondary prevention.    Lab Results   Component Value Date/Time    LDL, calculated 95 01/24/2017 09:00 AM    not at goal  Future Appointments  Date Time Provider Stephanie Javier   6/4/2018 9:00 AM VASCULAR, [address]   6/4/2018 10:00 AM Kasey Poon  [address]      Patient Instructions   You will need to follow up in clinic with Dr. Summer Bain in 2-3 weeks with JOSE JUAN study. There were changes made to your medications at this appointment. Please note the following:  INCREASE atenolol to 100 mg, daily. It is okay to double up with the 50 mg tablet that you have at home to finish what you already have. An updated script has been sent to your pharmacy so your refill will be a 100 mg tablet. Key CAD CHF Meds             atenolol (TENORMIN) 100 mg tablet  (Taking) Take 1 Tab by mouth every evening. nitroglycerin (NITRODUR) 0.2 mg/hr  (Taking) 1 Patch by TransDERmal route daily. Indications: Chronic Stable Angina Pectoris    atorvastatin (LIPITOR) 20 mg tablet  (Taking) TAKE 1 TABLET NIGHTLY.    enalapril (VASOTEC) 10 mg tablet  (Taking) TAKE 1 TABLET TWICE A DAY    aspirin delayed-release 81 mg tablet  (Taking) Take  by mouth daily. Impression:   1. Coronary artery disease involving coronary bypass graft of native heart with angina pectoris ([address]    2. Essential hypertension    3. Claudication ([address]    4. Coronary artery disease of bypass graft of native heart with stable angina pectoris ([address]    5. S/P CABG x 3    6. Hypercholesteremia       Cardiac History:   CABG 11-12-14= LIMA to LAD, SVG to OM1,OM2    TTE 11/5/14 LVEF 55 % to 60 %. No WMA. .    Cath 11/11/14 RCA patent stent, mLCX 80, OM1 mid 80 LAD- 85 tubular at ostium EF60%  There was significant 2-vessel coronary artery disease (LAD and  circumflex). Ostial LAD: There was a tubular 85 % stenosis. Mid LAD: There was a discrete 80 % stenosis. Distal LAD: Angiography showed mild atherosclerosis. 1st obtuse marginal: There was a discrete 80 % stenosis at the ostium of  the vessel segment.  2nd obtuse marginal: There was a discrete 50 %  stenosis at the ostium of the vessel segment. Left AV groove artery: There  was a tubular 80 % stenosis at the ostium of the vessel segment. Right PDA: Angiography showed moderate atherosclerosis. CARDIAC STRUCTURES:  Global left ventricular function was normal. EF estimated was 60 %. ROS-except as noted above. . A complete cardiac and respiratory are reviewed and negative except as above ; Resp-denies wheezing  or productive cough,. Const- No unusual weight loss or fever; Neuro-no recent seizure or CVA ; GI- No BRBPR, abdom pain, bloating ; - no  hematuria   see supplement sheet, initialed and to be scanned by staff  Past Medical History:   Diagnosis Date    CAD (coronary artery disease)     stent to [facility];CABG 11-12-14    DM2 (diabetes mellitus, type 2) [facility]     Hypercholesteremia     Hypertension       Social Hx= reports that he has never smoked. He has never used smokeless tobacco. He reports that he does not drink alcohol or use illicit drugs. Exam and Labs:  /80 (BP 1 Location: Left arm, BP Patient Position: Sitting)  Pulse 71  Resp 16  Ht 5' 9\" (1.753 m)  Wt 234 lb (106.1 kg)  SpO2 98%  BMI 34.56 kg/f9Ysllpolimcsixb:  NAD, comfortable  Head: NC,AT. Eyes: No scleral icterus. Neck:  Neck supple. No JVD present. Throat: moist mucous membranes. Chest: Effort normal & normal respiratory excursion . Neurological: alert, conversant and oriented . Skin: Skin is not cold. No obvious systemic rash noted. Not diaphoretic. No erythema. Psychiatric:  Grossly normal mood and affect. Behavior appears normal. Extremities:  no clubbing or cyanosis. Abdomen: non distended    Lungs:breath sounds normal. No stridor. distress, wheezes or  Rales. Heart: normal rate, regular rhythm, normal S1, S2, no murmurs, rubs, clicks or gallops , PMI non displaced. Edema: Edema is none.   Lab Results   Component Value Date/Time    Cholesterol, total 163 01/24/2017 09:00 AM    HDL Cholesterol 39 (L) 01/24/2017 09:00 AM    LDL, calculated 95 01/24/2017 09:00 AM    Triglyceride 146 01/24/2017 09:00 AM     Lab Results   Component Value Date/Time    Sodium 141 01/24/2017 09:00 AM    Potassium 4.7 01/24/2017 09:00 AM    Chloride 101 01/24/2017 09:00 AM    CO2 24 01/24/2017 09:00 AM    Anion gap 3 (L) 11/16/2014 04:07 AM    Glucose 174 (H) 01/24/2017 09:00 AM    BUN 13 01/24/2017 09:00 AM    Creatinine 0.83 01/24/2017 09:00 AM    BUN/Creatinine ratio 16 01/24/2017 09:00 AM    GFR est  01/24/2017 09:00 AM    GFR est non-AA 94 01/24/2017 09:00 AM    Calcium 9.5 01/24/2017 09:00 AM      Wt Readings from Last 3 Encounters:   05/24/18 234 lb (106.1 kg)   07/11/17 234 lb (106.1 kg)   05/10/17 253 lb (114.8 kg)      BP Readings from Last 3 Encounters:   05/24/18 138/80   07/11/17 145/77   05/10/17 133/77      Current Outpatient Prescriptions   Medication Sig    metFORMIN ER (GLUCOPHAGE XR) 500 mg tablet TAKE 1 TABLET BEFORE BREAKFAST & DINNER    atorvastatin (LIPITOR) 20 mg tablet TAKE 1 TABLET NIGHTLY.  enalapril (VASOTEC) 10 mg tablet TAKE 1 TABLET TWICE A DAY    atenolol (TENORMIN) 50 mg tablet TAKE 1 TABLET EVERY EVENING.  nitroglycerin (NITRODUR) 0.2 mg/hr 1 Patch by TransDERmal route daily. Indications: CHRONIC STABLE ANGINA PECTORIS    aspirin delayed-release 81 mg tablet Take  by mouth daily.  glucose blood VI test strips (BLOOD GLUCOSE TEST) strip Check blood sugar once daily    Blood-Glucose Meter monitoring kit Please dispense one Contour meter -diagnosis code 250.00- Check BS once daily.  Lancets misc Check BS once daily     No current facility-administered medications for this visit. Impression see above.       Written by Ruy Waite, as dictated by Joan Sandoval MD.

## 2018-05-24 NOTE — PATIENT INSTRUCTIONS
You will need to follow up in clinic with Dr. Pam Niño in 2-3 weeks with JOSE JUAN study. There were changes made to your medications at this appointment. Please note the following:  INCREASE atenolol to 100 mg, daily. It is okay to double up with the 50 mg tablet that you have at home to finish what you already have. An updated script has been sent to your pharmacy so your refill will be a 100 mg tablet.

## 2018-06-04 ENCOUNTER — CLINICAL SUPPORT (OUTPATIENT)
Dept: CARDIOLOGY CLINIC | Age: 63
End: 2018-06-04

## 2018-06-04 ENCOUNTER — OFFICE VISIT (OUTPATIENT)
Dept: CARDIOLOGY CLINIC | Age: 63
End: 2018-06-04

## 2018-06-04 VITALS
SYSTOLIC BLOOD PRESSURE: 140 MMHG | WEIGHT: 233.4 LBS | HEIGHT: 69 IN | RESPIRATION RATE: 16 BRPM | BODY MASS INDEX: 34.57 KG/M2 | HEART RATE: 64 BPM | DIASTOLIC BLOOD PRESSURE: 70 MMHG

## 2018-06-04 DIAGNOSIS — I25.709 CORONARY ARTERY DISEASE INVOLVING CORONARY BYPASS GRAFT OF NATIVE HEART WITH ANGINA PECTORIS (HCC): Primary | ICD-10-CM

## 2018-06-04 DIAGNOSIS — Z95.1 S/P CABG X 3: ICD-10-CM

## 2018-06-04 DIAGNOSIS — E11.9 TYPE 2 DIABETES MELLITUS WITHOUT COMPLICATION, WITHOUT LONG-TERM CURRENT USE OF INSULIN (HCC): ICD-10-CM

## 2018-06-04 DIAGNOSIS — Z01.818 PREOP TESTING: ICD-10-CM

## 2018-06-04 DIAGNOSIS — I73.9 CLAUDICATION (HCC): Primary | ICD-10-CM

## 2018-06-04 DIAGNOSIS — E78.00 HYPERCHOLESTEREMIA: ICD-10-CM

## 2018-06-04 DIAGNOSIS — I10 ESSENTIAL HYPERTENSION: ICD-10-CM

## 2018-06-04 RX ORDER — NITROGLYCERIN 0.4 MG/1
0.4 TABLET SUBLINGUAL
Qty: 1 BOTTLE | Refills: 1 | Status: SHIPPED | OUTPATIENT
Start: 2018-06-04 | End: 2020-01-22 | Stop reason: SDUPTHER

## 2018-06-04 RX ORDER — ENALAPRIL MALEATE 10 MG/1
TABLET ORAL
Qty: 180 TAB | Refills: 1 | Status: SHIPPED | OUTPATIENT
Start: 2018-06-04 | End: 2018-12-13 | Stop reason: SDUPTHER

## 2018-06-04 RX ORDER — NITROGLYCERIN 40 MG/1
1 PATCH TRANSDERMAL DAILY
Qty: 90 PATCH | Refills: 1 | Status: SHIPPED | OUTPATIENT
Start: 2018-06-04

## 2018-06-04 NOTE — MR AVS SNAPSHOT
[facility] [address] [place] [address] 
[phone] Patient: Freddie Diaz MRN: [id number] PLK:[id number] Visit Information Date & Time Provider Department Dept. Phone Encounter #  
 6/4/2018 10:00 AM La Hutchins MD [facility] [phone] [id number] Upcoming Health Maintenance Date Due DTaP/Tdap/Td series (1 - Tdap) 4/20/1975 EYE EXAM RETINAL OR DILATED Q1 12/5/2015 HEMOGLOBIN A1C Q6M 1/11/2018 MICROALBUMIN Q1 1/24/2018 LIPID PANEL Q1 1/24/2018 MEDICARE YEARLY EXAM 3/14/2018 FOOT EXAM Q1 7/19/2018 Influenza Age 5 to Adult 8/1/2018 COLONOSCOPY 12/5/2024 Allergies as of 6/4/2018  Review Complete On: 6/4/2018 By: La Hutchins MD  
  
 Severity Noted Reaction Type Reactions Percocet [Oxycodone-acetaminophen]  11/11/2014    Swelling Current Immunizations  Never Reviewed No immunizations on file. Not reviewed this visit You Were Diagnosed With   
  
 Codes Comments Coronary artery disease involving coronary bypass graft of native heart with angina pectoris ([facility]    -  Primary ICD-10-CM: I25.709 ICD-9-CM: 414.05, 413.9 Essential hypertension     ICD-10-CM: I10 
ICD-9-CM: 401.9 Hypercholesteremia     ICD-10-CM: E78.00 ICD-9-CM: 272.0 Type 2 diabetes mellitus without complication, without long-term current use of insulin (HCC)     ICD-10-CM: E11.9 ICD-9-CM: 250.00 S/P CABG x 3     ICD-10-CM: Z95.1 ICD-9-CM: V45.81 Preop testing     ICD-10-CM: D44.187 ICD-9-CM: V72.84 Vitals BP Pulse Resp Height(growth percentile) Weight(growth percentile) BMI  
 140/70 (BP 1 Location: Right arm, BP Patient Position: Sitting) 64 16 5' 9\" (1.753 m) 233 lb 6.4 oz (105.9 kg) 34.47 kg/m2 Smoking Status Former Smoker Vitals History BMI and BSA Data Body Mass Index Body Surface Area  34.47 kg/m 2 2.27 m 2  
 Preferred Pharmacy Pharmacy Name Phone Dianne Salazar, [address] [facility]. [phone] Your Updated Medication List  
  
   
This list is accurate as of 18 12:04 PM.  Always use your most recent med list.  
  
  
  
  
 aspirin delayed-release 81 mg tablet Take  by mouth daily. atenolol 100 mg tablet Commonly known as:  TENORMIN Take 1 Tab by mouth every evening. atorvastatin 20 mg tablet Commonly known as:  LIPITOR  
TAKE 1 TABLET NIGHTLY. Blood-Glucose Meter monitoring kit Please dispense one Contour meter -diagnosis code 250.00- Check BS once daily. enalapril 10 mg tablet Commonly known as:  VASOTEC  
TAKE 1 TABLET TWICE A DAY  
  
 glucose blood VI test strips strip Commonly known as:  blood glucose test  
Check blood sugar once daily Lancets Misc Check BS once daily  
  
 metFORMIN  mg tablet Commonly known as:  GLUCOPHAGE XR  
TAKE 1 TABLET BEFORE BREAKFAST & DINNER  
  
 * nitroglycerin 0.2 mg/hr Commonly known as:  NITRODUR  
1 Patch by TransDERmal route daily. Indications: Chronic Stable Angina Pectoris * nitroglycerin 0.4 mg SL tablet Commonly known as:  NITROSTAT  
1 Tab by SubLINGual route every five (5) minutes as needed for Chest Pain. * Notice: This list has 2 medication(s) that are the same as other medications prescribed for you. Read the directions carefully, and ask your doctor or other care provider to review them with you. Prescriptions Sent to Pharmacy Refills  
 nitroglycerin (NITRODUR) 0.2 mg/hr 1 Si Patch by TransDERmal route daily. Indications: Chronic Stable Angina Pectoris Class: Normal  
 Pharmacy: [place] [id number]. Ph #: [phone] Route: TransDERmal  
 enalapril (VASOTEC) 10 mg tablet 1 Sig: TAKE 1 TABLET TWICE A DAY Class: Normal  
 Pharmacy: [place] [id number]. Ph #: [phone] nitroglycerin (NITROSTAT) 0.4 mg SL tablet 1 Si Tab by SubLINGual route every five (5) minutes as needed for Chest Pain. Class: Normal  
 Pharmacy: [facility].  #: [phone] Route: SubLINGual  
  
We Performed the Following CBC WITH AUTOMATED DIFF [22813 CPT(R)] METABOLIC PANEL, BASIC [30065 CPT(R)] To-Do List   
 06/15/2018 9:30 AM  
  Appointment with CATH ROOM 1 [facility] at [address] ([phone]) NPO AFTER MIDNIGHT! ROUTINE CASES:  Please arrive 2 hours prior to your scheduled appointment time. If your scheduled appointment is for 7:30am, 8:00am or 8:15am, please arrive by 6:45am.  NON ROUTINE CASES:  1. If you require labs, x-ray, EKG or meds-please arrive 3 hours prior to your appointment time. 2.  If you require hydration prior to your procedure-please arrive 4 hours prior to your appointment time. ** [facility] responsibility to notify the [facility] Lab  of any prep required outside of the normal routine case** Patient Instructions You will need to follow up in clinic with Dr. Candace Gonzalez in 4 months. Your Cardiac Cath procedure has been scheduled for Friday 6/15/18 at 9:30 AM, at [address]. 
 
Please report to Admitting Department by 7:30 AM, or 2 hours prior to your scheduled procedure. Please bring a list of your current medications and medication bottles, if able, to the hospital on this day. You will be unable to drive after your procedure so please make sure to bring someone with you to your procedure. You will need to have nothing to eat or drink after midnight, the night prior to your procedure. You may have small sips of water, if needed, to take with your medication. You will need labs drawn prior to your procedure. Please go to [facility] to have this done no later than 18.    
 
You should stop your medication, metformin, the day before, the day of and 2 days following your scheduled procedure. Introducing \[facility] [place] [facility]\"" & HEALTH SERVICES! Dear Phillip Peña: Thank you for requesting a S[employer] account. Our records indicate that you already have an active Syncbak account. You can access your account anytime at [email or web address] Did you know that you can access your hospital and ER discharge instructions at any time in Syncbak? You can also review all of your test results from your hospital stay or ER visit. Additional Information If you have questions, please visit the Frequently Asked Questions section of the Syncbak website at [email or web address] Remember, Syncbak is NOT to be used for urgent needs. For medical emergencies, dial 911. Now available from your iPhone and Android! Please provide this summary of care documentation to your next provider. Your primary care clinician is listed as Racquel Villarreal. If you have any questions after today's visit, please call [phone].

## 2018-06-04 NOTE — PROGRESS NOTES
Andreea Ybarra Marker     1955       Palma La MD, [facility] [place]  Date of Visit-6/4/2018   PCP is London Lang MD   [facility] [place]  [facility]  HPI:  Vlad Dietz is a 58 y.o. male   Subjective:  Mr. Linda Mortensen returns today accompanied by his daughter. I saw them a couple weeks ago. He was noticing chest pain when he walked short distances and his legs would give out with pain in his calves. We went ahead and got ABIs done. They are done today and show no evidence of PVD with normal JOSE JUAN measurements. On the right is 1.2 and left ankle brachial index is 1.04. Since he started the increased Atenolol and Nitrodur patch he says things are better. He still had three episodes of chest pain since I saw him two weeks ago. He says each time it was associated with exertion. He says he fights through it. He is fairly stoic about it and still does not want a cath unless he absolutely has to. He does not smoke or drink alcohol. His discomfort has been going on for the last 4-5 months. He has a nodule on his lung and is going to see pulmonary. Assessment/Plan:       Impression/Plan:  1. CAD native vessel with bypass surgery, CABG 2014. Now with unstable angina, some improvement with Nitrodur and Atenolol, but still has exertional chest pain. He has declined a cath at this time. He does agree to a stress test and would agree to a cath if stress test was markedly abnormal.  He continues on aspirin and high potency statin, along with Nitrodur patch, which he forgot to wear today, and he has SL nitro. So we will need refills on the SL nitros and nitro patch in addition to Enalapril. 2. Hypertension, reasonable control on Enalapril and Atenolol. 3. Unstable angina, improved, but not resolved with Atenolol. Dosage increased  4. Leg pain. ABIs and PVRs are normal.  Will not need a change in medications at this time. 5. Follow up four months.   Refill meds, get a stress nuclear. BP Readings from Last 3 Encounters:   06/04/18 140/70   05/24/18 138/80   07/11/17 145/77    6 Lipids on high potency statin as appropriate for secondary prevention. Lab Results   Component Value Date/Time    LDL, calculated 95 01/24/2017 09:00 AM    not at goal        No future appointments. There are no Patient Instructions on file for this visit. Key CAD CHF Meds             atenolol (TENORMIN) 100 mg tablet  (Taking) Take 1 Tab by mouth every evening. nitroglycerin (NITRODUR) 0.2 mg/hr  (Taking) 1 Patch by TransDERmal route daily. Indications: Chronic Stable Angina Pectoris    atorvastatin (LIPITOR) 20 mg tablet  (Taking) TAKE 1 TABLET NIGHTLY.    enalapril (VASOTEC) 10 mg tablet  (Taking) TAKE 1 TABLET TWICE A DAY    aspirin delayed-release 81 mg tablet  (Taking) Take  by mouth daily. At end of visit patient called me back in and 2 more daughters had come in  We discussed his angina and he now agrees to proceed with cath and possible PCI  He understands risks rationale and benefits  risks and benefits discussed  2/1000 serious life threatening risk includes stroke, heart attack leading to  death  1/100 prolong hospital stay above and bleeding, groin complications, infection, renals possible but  unlikely unless baseline renal dysfunction, tear in cardiac vessel, tamponade  PCI 9-4/235 similar complications but benefits likely outweigh risk      Impression:   1. Coronary artery disease involving coronary bypass graft of native heart with angina pectoris ([address]    2. Essential hypertension    3. Hypercholesteremia    4. Type 2 diabetes mellitus without complication, without long-term current use of insulin ([address]    5. S/P CABG x 3    6. Preop testing       Cardiac History:   CABG 11-12-14= LIMA to LAD, SVG to OM1,OM2    TTE 11/5/14 LVEF 55 % to 60 %. No WMA. .    Cath 11/11/14 RCA patent stent, mLCX 80, OM1 mid 80 LAD- 85 tubular at ostium EF60%  There was significant 2-vessel coronary artery disease (LAD and  circumflex). Ostial LAD: There was a tubular 85 % stenosis. Mid LAD: There was a discrete 80 % stenosis. Distal LAD: Angiography showed mild atherosclerosis. 1st obtuse marginal: There was a discrete 80 % stenosis at the ostium of  the vessel segment. 2nd obtuse marginal: There was a discrete 50 %  stenosis at the ostium of the vessel segment. Left AV groove artery: There  was a tubular 80 % stenosis at the ostium of the vessel segment. Right PDA: Angiography showed moderate atherosclerosis. CARDIAC STRUCTURES:  Global left ventricular function was normal. EF estimated was 60 %. ROS-except as noted above. . A complete cardiac and respiratory are reviewed and negative except as above ; Resp-denies wheezing  or productive cough,. Const- No unusual weight loss or fever; Neuro-no recent seizure or CVA ; GI- No BRBPR, abdom pain, bloating ; - no  hematuria   see supplement sheet, initialed and to be scanned by staff  Past Medical History:   Diagnosis Date    CAD (coronary artery disease)     stent to [facility];CABG 11-12-14    DM2 (diabetes mellitus, type 2) ([facility]     Hypercholesteremia     Hypertension       Social Hx= reports that he has quit smoking. He has never used smokeless tobacco. He reports that he does not drink alcohol or use illicit drugs. Exam and Labs:  /70 (BP 1 Location: Right arm, BP Patient Position: Sitting)  Pulse 64  Resp 16  Ht 5' 9\" (1.753 m)  Wt 233 lb 6.4 oz (105.9 kg)  BMI 34.47 kg/[place]:  NAD, comfortable  Head: NC,AT. Eyes: No scleral icterus. Neck:  Neck supple. No JVD present. Throat: moist mucous membranes. Chest: Effort normal & normal respiratory excursion . Neurological: alert, conversant and oriented . Skin: Skin is not cold. No obvious systemic rash noted. Not diaphoretic. No erythema. Psychiatric:  Grossly normal mood and affect. Behavior appears normal. Extremities:  no clubbing or cyanosis. Abdomen: non distended    Lungs:breath sounds normal. No stridor. distress, wheezes or  Rales. Heart: normal rate, regular rhythm, normal S1, S2, no murmurs, rubs, clicks or gallops , PMI non displaced. Edema: Edema is none. Wt Readings from Last 3 Encounters:   06/04/18 233 lb 6.4 oz (105.9 kg)   05/24/18 234 lb (106.1 kg)   07/11/17 234 lb (106.1 kg)      BP Readings from Last 3 Encounters:   06/04/18 140/70   05/24/18 138/80   07/11/17 145/77      Current Outpatient Prescriptions   Medication Sig    atenolol (TENORMIN) 100 mg tablet Take 1 Tab by mouth every evening.  nitroglycerin (NITRODUR) 0.2 mg/hr 1 Patch by TransDERmal route daily. Indications: Chronic Stable Angina Pectoris    metFORMIN ER (GLUCOPHAGE XR) 500 mg tablet TAKE 1 TABLET BEFORE BREAKFAST & DINNER    atorvastatin (LIPITOR) 20 mg tablet TAKE 1 TABLET NIGHTLY.  enalapril (VASOTEC) 10 mg tablet TAKE 1 TABLET TWICE A DAY    aspirin delayed-release 81 mg tablet Take  by mouth daily.  glucose blood VI test strips (BLOOD GLUCOSE TEST) strip Check blood sugar once daily    Blood-Glucose Meter monitoring kit Please dispense one Contour meter -diagnosis code 250.00- Check BS once daily.  Lancets misc Check BS once daily     No current facility-administered medications for this visit. Impression see above. Jim Giraldo

## 2018-06-04 NOTE — PROCEDURES
[facility]  *** FINAL REPORT ***    Name: Linda Wills  MRN: [id number]       Outpatient  : 1955  HIS Order #: [id number]  [address] [facility] Visit #: [id number]  Date: 2018    TYPE OF TEST: Peripheral Arterial Testing    REASON FOR TEST  Claudication (both sides)    Right Leg  Segmentals: Normal                     mmHg  Brachial         148  High thigh       210  Low thigh        192  Calf             181  Posterior tibial 170  Dorsalis pedis   187  Peroneal  Metatarsal  Toe pressure     118  Doppler:    Normal  PVR:        Normal  Ankle/Brachial: 1.20    Left Leg  Segmentals: Normal                     mmHg  Brachial         156  High thigh       210  Low thigh        179  Calf             165  Posterior tibial 163  Dorsalis pedis   162  Peroneal  Metatarsal  Toe pressure     119  Doppler:    Normal  PVR:        Normal  Ankle/Brachial: 1.04    INTERPRETATION/FINDINGS  PROCEDURE:  Multi-level lower extemity arterial segmental pressures,  pulse volume recordings and CW Doppler wavefoms. Findings:  Doppler waveforms are multiphasic at all levels  bilaterally. Pulse volume recordings are normal in configuration. There are no significant pressure gradients. Resting ankle/brachial  indices are within normal limits. Exercise was not undertaken as  patient forgot to wear nitropatch and had chest pain on the day of  this exam.    IMPRESSION:  1. No evidence of significant peripheral arterial disease at rest in  the right leg. 2. No evidence of significant peripheral arterial disease at rest in  the left leg. 3. The right ankle/brachial index is 1.20 and the left ankle/brachial  index is 1.04.  4. Treadmill testing was not performed secondary to cardiac risk  (chest pain on day of exam). ADDITIONAL COMMENTS    I have personally reviewed the data relevant to the interpretation of  this  study.     TECHNOLOGIST: Molly Alexis RVT, TAWANDA, RDCS  Signed: 2018 10:00 AM    PHYSICIAN: Lissette Alvares MD  Signed: 06/04/2018 06:10 PM

## 2018-06-04 NOTE — PATIENT INSTRUCTIONS
You will need to follow up in clinic with Dr. Hector Bellamy in 4 months. Your Cardiac Cath procedure has been scheduled for Friday 6/15/18 at 9:30 AM, at [facility].    Please report to Admitting Department by 7:30 AM, or 2 hours prior to your scheduled procedure. Please bring a list of your current medications and medication bottles, if able, to the hospital on this day. You will be unable to drive after your procedure so please make sure to bring someone with you to your procedure. You will need to have nothing to eat or drink after midnight, the night prior to your procedure. You may have small sips of water, if needed, to take with your medication. You will need labs drawn prior to your procedure. Please go to [facility] to have this done no later than Tuesday 6/12/18. You should stop your medication, metformin, the day before, the day of and 2 days following your scheduled procedure.

## 2018-06-11 RX ORDER — SODIUM CHLORIDE 9 MG/ML
75 INJECTION, SOLUTION INTRAVENOUS CONTINUOUS
Status: CANCELLED | OUTPATIENT
Start: 2018-06-15 | End: 2018-06-15

## 2018-06-11 RX ORDER — SODIUM CHLORIDE 0.9 % (FLUSH) 0.9 %
5-10 SYRINGE (ML) INJECTION AS NEEDED
Status: CANCELLED | OUTPATIENT
Start: 2018-06-15

## 2018-06-11 RX ORDER — SODIUM CHLORIDE 0.9 % (FLUSH) 0.9 %
5-10 SYRINGE (ML) INJECTION EVERY 8 HOURS
Status: CANCELLED | OUTPATIENT
Start: 2018-06-15

## 2018-06-11 RX ORDER — DIPHENHYDRAMINE HYDROCHLORIDE 50 MG/ML
25 INJECTION, SOLUTION INTRAMUSCULAR; INTRAVENOUS
Status: CANCELLED | OUTPATIENT
Start: 2018-06-15 | End: 2018-06-15

## 2018-06-14 ENCOUNTER — TELEPHONE (OUTPATIENT)
Dept: CARDIOLOGY CLINIC | Age: 63
End: 2018-06-14

## 2018-06-14 NOTE — TELEPHONE ENCOUNTER
Verified patient with two types of identifiers. Notified patient's daughter, Uzma Jin, verified on [place] form, that patient will not have general anesthesia; he will have conscious sedation. Christie asked if patient should stop his ASA and Atenolol prior to the procedure. Christie states that his instructions only say to hold Metformin. Instructed Christie to go by pre-procedure instructions given at last OV. Patient verbalized understanding and will call with any other questions.

## 2018-06-14 NOTE — TELEPHONE ENCOUNTER
Patient's daughter Sony Tia called in and stated that her father wants to know if they will be putting him to sleep for the heart cath tomorrow morning.   Phone [phone]  Elian Arauz

## 2018-06-15 ENCOUNTER — HOSPITAL ENCOUNTER (OUTPATIENT)
Dept: CARDIAC CATH/INVASIVE PROCEDURES | Age: 63
Setting detail: OBSERVATION
Discharge: HOME | End: 2018-06-16
Attending: SPECIALIST | Admitting: SPECIALIST
Payer: MEDICARE

## 2018-06-15 PROBLEM — Z95.820 STATUS POST ANGIOPLASTY WITH STENT: Status: ACTIVE | Noted: 2018-06-15

## 2018-06-15 PROBLEM — I25.10 CAD (CORONARY ARTERY DISEASE), NATIVE CORONARY ARTERY: Status: ACTIVE | Noted: 2018-06-15

## 2018-06-15 LAB
ANION GAP SERPL CALC-SCNC: 8 MMOL/L (ref 5–15)
BASOPHILS # BLD: 0 K/UL (ref 0–0.1)
BASOPHILS NFR BLD: 0 % (ref 0–1)
BUN SERPL-MCNC: 13 MG/DL (ref 6–20)
BUN/CREAT SERPL: 15 (ref 12–20)
CALCIUM SERPL-MCNC: 8.9 MG/DL (ref 8.5–10.1)
CHLORIDE SERPL-SCNC: 106 MMOL/L (ref 97–108)
CO2 SERPL-SCNC: 26 MMOL/L (ref 21–32)
CREAT SERPL-MCNC: 0.88 MG/DL (ref 0.7–1.3)
DIFFERENTIAL METHOD BLD: ABNORMAL
EOSINOPHIL # BLD: 0.1 K/UL (ref 0–0.4)
EOSINOPHIL NFR BLD: 2 % (ref 0–7)
ERYTHROCYTE [DISTWIDTH] IN BLOOD BY AUTOMATED COUNT: 13.2 % (ref 11.5–14.5)
GLUCOSE BLD STRIP.AUTO-MCNC: 160 MG/DL (ref 65–100)
GLUCOSE BLD STRIP.AUTO-MCNC: 166 MG/DL (ref 65–100)
GLUCOSE BLD STRIP.AUTO-MCNC: 244 MG/DL (ref 65–100)
GLUCOSE BLD STRIP.AUTO-MCNC: 247 MG/DL (ref 65–100)
GLUCOSE SERPL-MCNC: 238 MG/DL (ref 65–100)
HCT VFR BLD AUTO: 40.8 % (ref 36.6–50.3)
HGB BLD-MCNC: 13.4 G/DL (ref 12.1–17)
IMM GRANULOCYTES # BLD: 0 K/UL (ref 0–0.04)
IMM GRANULOCYTES NFR BLD AUTO: 1 % (ref 0–0.5)
LYMPHOCYTES # BLD: 1.3 K/UL (ref 0.8–3.5)
LYMPHOCYTES NFR BLD: 17 % (ref 12–49)
MCH RBC QN AUTO: 29.8 PG (ref 26–34)
MCHC RBC AUTO-ENTMCNC: 32.8 G/DL (ref 30–36.5)
MCV RBC AUTO: 90.7 FL (ref 80–99)
MONOCYTES # BLD: 0.7 K/UL (ref 0–1)
MONOCYTES NFR BLD: 9 % (ref 5–13)
NEUTS SEG # BLD: 5.7 K/UL (ref 1.8–8)
NEUTS SEG NFR BLD: 72 % (ref 32–75)
NRBC # BLD: 0 K/UL (ref 0–0.01)
NRBC BLD-RTO: 0 PER 100 WBC
PLATELET # BLD AUTO: 162 K/UL (ref 150–400)
PMV BLD AUTO: 12 FL (ref 8.9–12.9)
POTASSIUM SERPL-SCNC: 4.3 MMOL/L (ref 3.5–5.1)
RBC # BLD AUTO: 4.5 M/UL (ref 4.1–5.7)
SERVICE CMNT-IMP: ABNORMAL
SODIUM SERPL-SCNC: 140 MMOL/L (ref 136–145)
WBC # BLD AUTO: 7.9 K/UL (ref 4.1–11.1)

## 2018-06-15 PROCEDURE — 74011250637 HC RX REV CODE- 250/637: Performed by: SPECIALIST

## 2018-06-15 PROCEDURE — 74011250637 HC RX REV CODE- 250/637

## 2018-06-15 PROCEDURE — C1725 CATH, TRANSLUMIN NON-LASER: HCPCS

## 2018-06-15 PROCEDURE — 80048 BASIC METABOLIC PNL TOTAL CA: CPT | Performed by: SPECIALIST

## 2018-06-15 PROCEDURE — 74011000258 HC RX REV CODE- 258: Performed by: SPECIALIST

## 2018-06-15 PROCEDURE — C1887 CATHETER, GUIDING: HCPCS

## 2018-06-15 PROCEDURE — C1769 GUIDE WIRE: HCPCS

## 2018-06-15 PROCEDURE — 36415 COLL VENOUS BLD VENIPUNCTURE: CPT | Performed by: SPECIALIST

## 2018-06-15 PROCEDURE — 74011000250 HC RX REV CODE- 250

## 2018-06-15 PROCEDURE — 74011250636 HC RX REV CODE- 250/636: Performed by: SPECIALIST

## 2018-06-15 PROCEDURE — C1760 CLOSURE DEV, VASC: HCPCS

## 2018-06-15 PROCEDURE — 74011000250 HC RX REV CODE- 250: Performed by: SPECIALIST

## 2018-06-15 PROCEDURE — 77030004533 HC CATH ANGI DX IMP BSC -B

## 2018-06-15 PROCEDURE — C1874 STENT, COATED/COV W/DEL SYS: HCPCS

## 2018-06-15 PROCEDURE — 99218 HC RM OBSERVATION: CPT

## 2018-06-15 PROCEDURE — C1894 INTRO/SHEATH, NON-LASER: HCPCS

## 2018-06-15 PROCEDURE — 77030013715 HC INFL SYS MRTM -B

## 2018-06-15 PROCEDURE — 93459 L HRT ART/GRFT ANGIO: CPT

## 2018-06-15 PROCEDURE — 85025 COMPLETE CBC W/AUTO DIFF WBC: CPT | Performed by: SPECIALIST

## 2018-06-15 PROCEDURE — 82962 GLUCOSE BLOOD TEST: CPT

## 2018-06-15 PROCEDURE — 74011636320 HC RX REV CODE- 636/320: Performed by: SPECIALIST

## 2018-06-15 PROCEDURE — 77030013744

## 2018-06-15 PROCEDURE — 93005 ELECTROCARDIOGRAM TRACING: CPT

## 2018-06-15 RX ORDER — HEPARIN SODIUM 1000 [USP'U]/ML
1000-5000 INJECTION, SOLUTION INTRAVENOUS; SUBCUTANEOUS
Status: DISCONTINUED | OUTPATIENT
Start: 2018-06-15 | End: 2018-06-15

## 2018-06-15 RX ORDER — SODIUM CHLORIDE 0.9 % (FLUSH) 0.9 %
5-10 SYRINGE (ML) INJECTION AS NEEDED
Status: DISCONTINUED | OUTPATIENT
Start: 2018-06-15 | End: 2018-06-15

## 2018-06-15 RX ORDER — SODIUM CHLORIDE 0.9 % (FLUSH) 0.9 %
5-10 SYRINGE (ML) INJECTION AS NEEDED
Status: DISCONTINUED | OUTPATIENT
Start: 2018-06-15 | End: 2018-06-16 | Stop reason: HOSPADM

## 2018-06-15 RX ORDER — CLOPIDOGREL 300 MG/1
TABLET, FILM COATED ORAL
Status: COMPLETED
Start: 2018-06-15 | End: 2018-06-15

## 2018-06-15 RX ORDER — ASPIRIN 81 MG/1
81 TABLET ORAL DAILY
Status: DISCONTINUED | OUTPATIENT
Start: 2018-06-16 | End: 2018-06-16 | Stop reason: HOSPADM

## 2018-06-15 RX ORDER — CLOPIDOGREL BISULFATE 75 MG/1
75 TABLET ORAL DAILY
Status: DISCONTINUED | OUTPATIENT
Start: 2018-06-16 | End: 2018-06-16 | Stop reason: HOSPADM

## 2018-06-15 RX ORDER — METOPROLOL TARTRATE 5 MG/5ML
INJECTION INTRAVENOUS
Status: COMPLETED
Start: 2018-06-15 | End: 2018-06-15

## 2018-06-15 RX ORDER — LIDOCAINE HYDROCHLORIDE 10 MG/ML
10-30 INJECTION INFILTRATION; PERINEURAL
Status: DISCONTINUED | OUTPATIENT
Start: 2018-06-15 | End: 2018-06-15

## 2018-06-15 RX ORDER — SODIUM CHLORIDE 9 MG/ML
1.5 INJECTION, SOLUTION INTRAVENOUS CONTINUOUS
Status: DISPENSED | OUTPATIENT
Start: 2018-06-15 | End: 2018-06-15

## 2018-06-15 RX ORDER — LORAZEPAM 2 MG/ML
1 INJECTION INTRAMUSCULAR
Status: DISCONTINUED | OUTPATIENT
Start: 2018-06-15 | End: 2018-06-16 | Stop reason: HOSPADM

## 2018-06-15 RX ORDER — SODIUM CHLORIDE 9 MG/ML
75 INJECTION, SOLUTION INTRAVENOUS CONTINUOUS
Status: DISCONTINUED | OUTPATIENT
Start: 2018-06-15 | End: 2018-06-15

## 2018-06-15 RX ORDER — CLOPIDOGREL 300 MG/1
600 TABLET, FILM COATED ORAL ONCE
Status: COMPLETED | OUTPATIENT
Start: 2018-06-15 | End: 2018-06-15

## 2018-06-15 RX ORDER — SODIUM CHLORIDE 0.9 % (FLUSH) 0.9 %
5-10 SYRINGE (ML) INJECTION EVERY 8 HOURS
Status: DISCONTINUED | OUTPATIENT
Start: 2018-06-15 | End: 2018-06-15

## 2018-06-15 RX ORDER — ATORVASTATIN CALCIUM 20 MG/1
20 TABLET, FILM COATED ORAL
Status: DISCONTINUED | OUTPATIENT
Start: 2018-06-15 | End: 2018-06-16 | Stop reason: HOSPADM

## 2018-06-15 RX ORDER — FENTANYL CITRATE 50 UG/ML
25-200 INJECTION, SOLUTION INTRAMUSCULAR; INTRAVENOUS
Status: DISCONTINUED | OUTPATIENT
Start: 2018-06-15 | End: 2018-06-15

## 2018-06-15 RX ORDER — MIDAZOLAM HYDROCHLORIDE 1 MG/ML
.5-1 INJECTION, SOLUTION INTRAMUSCULAR; INTRAVENOUS
Status: DISCONTINUED | OUTPATIENT
Start: 2018-06-15 | End: 2018-06-15

## 2018-06-15 RX ORDER — ENALAPRIL MALEATE 5 MG/1
10 TABLET ORAL 2 TIMES DAILY
Status: DISCONTINUED | OUTPATIENT
Start: 2018-06-15 | End: 2018-06-16 | Stop reason: HOSPADM

## 2018-06-15 RX ORDER — CLOPIDOGREL BISULFATE 75 MG/1
75 TABLET ORAL DAILY
Qty: 90 TAB | Refills: 3 | Status: SHIPPED | OUTPATIENT
Start: 2018-06-16 | End: 2018-07-18 | Stop reason: ALTCHOICE

## 2018-06-15 RX ORDER — NITROGLYCERIN 0.4 MG/1
0.4 TABLET SUBLINGUAL
Status: DISCONTINUED | OUTPATIENT
Start: 2018-06-15 | End: 2018-06-16 | Stop reason: HOSPADM

## 2018-06-15 RX ORDER — METOPROLOL TARTRATE 5 MG/5ML
5 INJECTION INTRAVENOUS ONCE
Status: COMPLETED | OUTPATIENT
Start: 2018-06-15 | End: 2018-06-15

## 2018-06-15 RX ORDER — IBUPROFEN 400 MG/1
400 TABLET ORAL
Status: DISCONTINUED | OUTPATIENT
Start: 2018-06-15 | End: 2018-06-16 | Stop reason: HOSPADM

## 2018-06-15 RX ORDER — SODIUM CHLORIDE 0.9 % (FLUSH) 0.9 %
5-10 SYRINGE (ML) INJECTION EVERY 8 HOURS
Status: DISCONTINUED | OUTPATIENT
Start: 2018-06-15 | End: 2018-06-16 | Stop reason: HOSPADM

## 2018-06-15 RX ORDER — DIPHENHYDRAMINE HYDROCHLORIDE 50 MG/ML
12.5 INJECTION, SOLUTION INTRAMUSCULAR; INTRAVENOUS
Status: DISCONTINUED | OUTPATIENT
Start: 2018-06-15 | End: 2018-06-16 | Stop reason: HOSPADM

## 2018-06-15 RX ORDER — HEPARIN SODIUM 200 [USP'U]/100ML
2000 INJECTION, SOLUTION INTRAVENOUS AS NEEDED
Status: DISCONTINUED | OUTPATIENT
Start: 2018-06-15 | End: 2018-06-15

## 2018-06-15 RX ORDER — ATENOLOL 50 MG/1
100 TABLET ORAL EVERY EVENING
Status: DISCONTINUED | OUTPATIENT
Start: 2018-06-15 | End: 2018-06-16

## 2018-06-15 RX ORDER — ATROPINE SULFATE 0.1 MG/ML
.5-1 INJECTION INTRAVENOUS AS NEEDED
Status: DISCONTINUED | OUTPATIENT
Start: 2018-06-15 | End: 2018-06-15

## 2018-06-15 RX ORDER — DIPHENHYDRAMINE HYDROCHLORIDE 50 MG/ML
25 INJECTION, SOLUTION INTRAMUSCULAR; INTRAVENOUS
Status: COMPLETED | OUTPATIENT
Start: 2018-06-15 | End: 2018-06-15

## 2018-06-15 RX ORDER — CLOPIDOGREL BISULFATE 75 MG/1
75 TABLET ORAL DAILY
Status: DISCONTINUED | OUTPATIENT
Start: 2018-06-16 | End: 2018-06-15

## 2018-06-15 RX ORDER — SODIUM CHLORIDE 9 MG/ML
3 INJECTION, SOLUTION INTRAVENOUS CONTINUOUS
Status: DISPENSED | OUTPATIENT
Start: 2018-06-15 | End: 2018-06-15

## 2018-06-15 RX ADMIN — NITROGLYCERIN 300 MCG: 5 INJECTION, SOLUTION INTRAVENOUS at 10:48

## 2018-06-15 RX ADMIN — NITROGLYCERIN 200 MCG: 5 INJECTION, SOLUTION INTRAVENOUS at 10:53

## 2018-06-15 RX ADMIN — FENTANYL CITRATE 25 MCG: 50 INJECTION, SOLUTION INTRAMUSCULAR; INTRAVENOUS at 10:29

## 2018-06-15 RX ADMIN — ATORVASTATIN CALCIUM 20 MG: 20 TABLET, FILM COATED ORAL at 22:17

## 2018-06-15 RX ADMIN — BIVALIRUDIN 183.4 MG/HR: 250 INJECTION, POWDER, LYOPHILIZED, FOR SOLUTION INTRAVENOUS at 10:32

## 2018-06-15 RX ADMIN — FENTANYL CITRATE 50 MCG: 50 INJECTION, SOLUTION INTRAMUSCULAR; INTRAVENOUS at 09:50

## 2018-06-15 RX ADMIN — CLOPIDOGREL 600 MG: 300 TABLET, FILM COATED ORAL at 11:02

## 2018-06-15 RX ADMIN — SODIUM CHLORIDE 1.5 ML/KG/HR: 900 INJECTION, SOLUTION INTRAVENOUS at 09:50

## 2018-06-15 RX ADMIN — MIDAZOLAM HYDROCHLORIDE 2 MG: 1 INJECTION, SOLUTION INTRAMUSCULAR; INTRAVENOUS at 10:03

## 2018-06-15 RX ADMIN — ATENOLOL 100 MG: 50 TABLET ORAL at 17:29

## 2018-06-15 RX ADMIN — MIDAZOLAM HYDROCHLORIDE 1 MG: 1 INJECTION, SOLUTION INTRAMUSCULAR; INTRAVENOUS at 10:24

## 2018-06-15 RX ADMIN — HEPARIN SODIUM IN SODIUM CHLORIDE 2000 UNITS: 200 INJECTION INTRAVENOUS at 09:57

## 2018-06-15 RX ADMIN — IOPAMIDOL 279 ML: 755 INJECTION, SOLUTION INTRAVENOUS at 11:05

## 2018-06-15 RX ADMIN — DIPHENHYDRAMINE HYDROCHLORIDE 25 MG: 50 INJECTION, SOLUTION INTRAMUSCULAR; INTRAVENOUS at 09:39

## 2018-06-15 RX ADMIN — FENTANYL CITRATE 25 MCG: 50 INJECTION, SOLUTION INTRAMUSCULAR; INTRAVENOUS at 10:57

## 2018-06-15 RX ADMIN — LIDOCAINE HYDROCHLORIDE 10 ML: 10 INJECTION, SOLUTION INFILTRATION; PERINEURAL at 10:09

## 2018-06-15 RX ADMIN — Medication 10 ML: at 14:00

## 2018-06-15 RX ADMIN — METOPROLOL TARTRATE 5 MG: 5 INJECTION, SOLUTION INTRAVENOUS at 10:53

## 2018-06-15 RX ADMIN — MIDAZOLAM HYDROCHLORIDE 2 MG: 1 INJECTION, SOLUTION INTRAMUSCULAR; INTRAVENOUS at 10:57

## 2018-06-15 RX ADMIN — MIDAZOLAM HYDROCHLORIDE 2 MG: 1 INJECTION, SOLUTION INTRAMUSCULAR; INTRAVENOUS at 09:49

## 2018-06-15 RX ADMIN — Medication 10 ML: at 22:17

## 2018-06-15 RX ADMIN — Medication 10 ML: at 10:04

## 2018-06-15 RX ADMIN — SODIUM CHLORIDE 1.5 ML/KG/HR: 900 INJECTION, SOLUTION INTRAVENOUS at 10:41

## 2018-06-15 RX ADMIN — CLOPIDOGREL BISULFATE 600 MG: 300 TABLET, FILM COATED ORAL at 11:02

## 2018-06-15 RX ADMIN — IOPAMIDOL 50 ML: 755 INJECTION, SOLUTION INTRAVENOUS at 10:34

## 2018-06-15 RX ADMIN — SODIUM CHLORIDE 3 ML/KG/HR: 900 INJECTION, SOLUTION INTRAVENOUS at 08:45

## 2018-06-15 RX ADMIN — METOPROLOL TARTRATE 5 MG: 5 INJECTION INTRAVENOUS at 10:53

## 2018-06-15 RX ADMIN — FENTANYL CITRATE 25 MCG: 50 INJECTION, SOLUTION INTRAMUSCULAR; INTRAVENOUS at 10:03

## 2018-06-15 RX ADMIN — ENALAPRIL MALEATE 10 MG: 5 TABLET ORAL at 17:31

## 2018-06-15 NOTE — PROGRESS NOTES
Seen in tele bed  Doing fine  Does not remember cath  Link Leandro over results with him and again with dtrs    Rx sent for Plavix  Med rec done   Jojo will call them for fu OV in September   Can be sooner at [facility] or [facility] if any problems  Dr Theresa Tucker to see in am for antcikrystal dc

## 2018-06-15 NOTE — PROGRESS NOTES
Grafts LIMA to LAD and SVG sequential to OM1 and OM2 are open  Previous RCA only mod atherosclerosis  Now RCA mid 90% and proximal 40%  PCI to RCA  Dr Inocencio Pulido felt stick might be in profunda  Placed Mynx  Since pt lives far from medical facility , will watch OP procedure in bed tonight and dc in am likely  Started Plavix  Continue   Key CAD CHF Meds             enalapril (VASOTEC) 10 mg tablet  (Taking) TAKE 1 TABLET TWICE A DAY    atenolol (TENORMIN) 100 mg tablet  (Taking) Take 1 Tab by mouth every evening. atorvastatin (LIPITOR) 20 mg tablet  (Taking) TAKE 1 TABLET NIGHTLY. aspirin delayed-release 81 mg tablet  (Taking) Take  by mouth daily. nitroglycerin (NITRODUR) 0.2 mg/hr 1 Patch by TransDERmal route daily. Indications: Chronic Stable Angina Pectoris    nitroglycerin (NITROSTAT) 0.4 mg SL tablet 1 Tab by SubLINGual route every five (5) minutes as needed for Chest Pain.         Plavix Rx to be sent , ready for dc , orders done  I have updated family after diagnostic cath and will have lab call them after PCI to discuss as Dr Inocencio Pulido did not get time to do so

## 2018-06-15 NOTE — CARDIO/PULMONARY
Cardiac Rehab: CAD education folder given to [facility]. Plavix education flyer added. Educated using teach back method. Reviewed CAD diagnosis definition and purpose of intervention. Discussed risk factors for CAD to include the following: family history, elevated BMI, hyperlipidemia, hypertension, and diabetes. Reviewed the importance of medication compliance, the purpose of Plavix, and potential side effects. Discussed follow up appointments with cardiologist, signs and symptoms of angina, and what to report to physician after discharge. Emphasized the value of cardiac rehab. Discussed Cardiac Rehab Program format, benefits, and encouraged enrollment to assist with risk modification and management. Patient states he lives [place] far away\" and declines cardiac rehab. [facility] Cardiac Rehab on AVS.    Ebera Tatyana Marker verbalized understanding with questions answered.  Wilberto Weiss RN

## 2018-06-15 NOTE — PROGRESS NOTES
TRANSFER - IN REPORT:    Verbal report received from Vadio on [facility]  being received from procedure for routine progression of care. Report consisted of patients Situation, Background, Assessment and Recommendations(SBAR). Information from the following report(s) SBAR, Kardex, Procedure Summary, Intake/Output, MAR, Recent Results, Med Rec Status and Cardiac Rhythm SR was reviewed with the receiving clinician. Opportunity for questions and clarification was provided. Assessment completed upon patients arrival to [address] [place] [address] and care assumed. Cardiac Cath Lab Recovery Arrival Note:    Chun Desai Marker arrived to [facility] recovery area. Patient procedure= LHC/stent. Patient on cardiac monitor, non-invasive blood pressure, SPO2 monitor. On  O2 @ 2 lpm via n/c. IV  of nacl on pump at 157 ml/hr. Patient status doing well without problems. Patient is sleepy, arousable to name. Patient reports no pain. PROCEDURE SITE CHECK:    Procedure site:without any bleeding and or hematoma, no pain/discomfort reported at procedure site. No change in patient status. Continue to monitor patient and status. 1400 transferred via stretcher to room 468 with Claritics.

## 2018-06-15 NOTE — IP AVS SNAPSHOT
[address] 
[phone] Patient: Lala Kwok MRN: [id number] RWX:[birth year] A check chintan indicates which time of day the medication should be taken. My Medications START taking these medications Instructions Each Dose to Equal  
 Morning Noon Evening Bedtime  
 clopidogrel 75 mg Tab Commonly known as:  PLAVIX Your last dose was: Your next dose is: Take 1 Tab by mouth daily. 75 mg CONTINUE taking these medications Instructions Each Dose to Equal  
 Morning Noon Evening Bedtime  
 aspirin delayed-release 81 mg tablet Your last dose was: Your next dose is: Take  by mouth daily. atenolol 100 mg tablet Commonly known as:  TENORMIN Your last dose was: Your next dose is: Take 1 Tab by mouth every evening. 100 mg  
    
   
   
   
  
 atorvastatin 20 mg tablet Commonly known as:  LIPITOR Your last dose was: Your next dose is: TAKE 1 TABLET NIGHTLY. Blood-Glucose Meter monitoring kit Your last dose was: Your next dose is: Please dispense one Contour meter -diagnosis code 250.00- Check BS once daily. enalapril 10 mg tablet Commonly known as:  Patt Cueto Your last dose was: Your next dose is: TAKE 1 TABLET TWICE A DAY  
     
   
   
   
  
 glucose blood VI test strips strip Commonly known as:  blood glucose test  
   
Your last dose was: Your next dose is:    
   
   
 Check blood sugar once daily Lancets Misc Your last dose was: Your next dose is:    
   
   
 Check BS once daily  
     
   
   
   
  
 metFORMIN  mg tablet Commonly known as:  GLUCOPHAGE XR Your last dose was: Your next dose is: TAKE 1 TABLET BEFORE BREAKFAST & DINNER  
     
   
   
   
  
 * nitroglycerin 0.2 mg/hr Commonly known as:  NAAQXQUZ Your last dose was: Your next dose is:    
   
   
 1 Patch by TransDERmal route daily. Indications: Chronic Stable Angina Pectoris 1 Patch * nitroglycerin 0.4 mg SL tablet Commonly known as:  NITROSTAT Your last dose was: Your next dose is:    
   
   
 1 Tab by SubLINGual route every five (5) minutes as needed for Chest Pain. 0.4 mg  
    
   
   
   
  
 * Notice: This list has 2 medication(s) that are the same as other medications prescribed for you. Read the directions carefully, and ask your doctor or other care provider to review them with you. Where to Get Your Medications These medications were sent to [address] [place] [address], [address] [place] [address]., [address] [place] [address] Phone:  [phone]  
  clopidogrel 75 mg Tab

## 2018-06-15 NOTE — IP AVS SNAPSHOT
Summary of Care Report The Summary of Care report has been created to help improve care coordination. Users with access to [employer] or [address] (Web-based application) may access additional patient information including the Discharge Summary. If you are not currently a [address] user and need more information, please call the number listed below in the [place] [address] section and ask to be connected with Medical Records. Facility Information Name Address Phone [address] [id number] [phone] Patient Information Patient Name Sex  Mica Masters ([id number]) Male 1955 Discharge Information Admitting Provider Service Area Unit La Hutchins MD / [address] [place] [address] Ctr / [phone] Discharge Provider Discharge Date/Time Discharge Disposition Destination (none) 2018 08:51 (Pending) AHR (none) Patient Language Language ENGLISH [13] Hospital Problems as of 2018  Reviewed: 2018 11:09 AM by La Hutchins MD  
  
  
  
 Class Noted - Resolved Last Modified POA Active Problems CAD (coronary artery disease), native coronary artery  6/15/2018 - Present 6/15/2018 by La Hutchins MD Unknown Entered by La Hutchins MD  
  Status post angioplasty with stent  6/15/2018 - Present 6/15/2018 by La Hutchins MD Unknown Entered by La Hutchins MD  
  
Non-Hospital Problems as of 2018  Reviewed: 2018 11:09 AM by La Hutchins MD  
  
  
  
 Class Noted - Resolved Last Modified Active Problems   HTN (hypertension)  10/20/2014 - Present 6/15/2018 by La Hutchins MD  
  Entered by Reyna Loo MD  
  CAD (coronary artery disease)  10/20/2014 - Present 6/15/2018 by La Hutchins MD  
  Entered by Reyna Loo MD  
 Hypercholesteremia  Unknown - Present 6/15/2018 by Alix Bauman MD  
  Entered by Alix Bauman MD  
  DM2 (diabetes mellitus, type 2) ([facility]  Unknown - Present 6/15/2018 by Alix Bauman MD  
  Entered by Alix Bauman MD  
  S/P CABG x 3  11/12/2014 - Present 6/15/2018 by Alix Bauman MD  
  Entered by Devorah Jennings PA-C Overview Signed 11/12/2014  3:56 PM by Devorah Jennings PA-C  
   CABG X 3 LIMA to LAD, Sequential RSVG to OM1, OM2 Type 2 diabetes mellitus without complication ([facility]  5/7/4415 - Present 6/5/2017 by Dorothy Vergara RN Entered by Dorothy Vergara RN You are allergic to the following Allergen Reactions Percocet (Oxycodone-Acetaminophen) Swelling Current Discharge Medication List  
  
START taking these medications Dose & Instructions Dispensing Information Comments  
 clopidogrel 75 mg Tab Commonly known as:  PLAVIX Dose:  75 mg Take 1 Tab by mouth daily. Quantity:  90 Tab Refills:  3 CONTINUE these medications which have NOT CHANGED Dose & Instructions Dispensing Information Comments  
 aspirin delayed-release 81 mg tablet Take  by mouth daily. Refills:  0  
   
 atenolol 100 mg tablet Commonly known as:  TENORMIN Dose:  100 mg Take 1 Tab by mouth every evening. Quantity:  30 Tab Refills:  5 Dose increased 5/24/18  
  
 atorvastatin 20 mg tablet Commonly known as:  LIPITOR  
 TAKE 1 TABLET NIGHTLY. Quantity:  30 Tab Refills:  5 Blood-Glucose Meter monitoring kit Please dispense one Contour meter -diagnosis code 250.00- Check BS once daily. Quantity:  1 Kit Refills:  0  
   
 enalapril 10 mg tablet Commonly known as:  VASOTEC  
 TAKE 1 TABLET TWICE A DAY Quantity:  180 Tab Refills:  1  
   
 glucose blood VI test strips strip Commonly known as:  blood glucose test  
 Check blood sugar once daily Quantity:  50 Strip Refills:  5 Please provide Contour strips Lancets Misc Check BS once daily Quantity:  1 Package Refills:  11  
   
 metFORMIN  mg tablet Commonly known as:  GLUCOPHAGE XR  
 TAKE 1 TABLET BEFORE BREAKFAST & DINNER Quantity:  60 Tab Refills:  0 Past due for follow up appointment. * nitroglycerin 0.2 mg/hr Commonly known as:  LXWRQFCY Dose:  1 Patch 1 Patch by TransDERmal route daily. Indications: Chronic Stable Angina Pectoris Quantity:  90 Patch Refills:  1  
   
 * nitroglycerin 0.4 mg SL tablet Commonly known as:  NITROSTAT Dose:  0.4 mg  
1 Tab by SubLINGual route every five (5) minutes as needed for Chest Pain. Quantity:  1 Bottle Refills:  1  
   
 * Notice: This list has 2 medication(s) that are the same as other medications prescribed for you. Read the directions carefully, and ask your doctor or other care provider to review them with you. Follow-up Information Follow up With Details Comments Contact Info [address] [place] [address] Cardiac Rehab  Call if you are interested in participating an a cardiac rehab program. [phone] Peng Lopez MD   Patient can only remember the practice name and not the physician Discharge Instructions Do not take Metformin until Sunday evening Take Plavix 75 every day as a blood thinner to prevent clots in the stent for one year, you will continue your aspirin. Chart Review Routing History Recipient Method Report Sent By Rick Marie MD  
Fax: [phone] Phone: [phone] Fax Notes/Transcriptions Sneha Knight [[id number]] 11/14/2014  1:13 PM 11/12/2014 Josie Hadley MD  
Fax: [phone] Phone: [phone] Fax Notes/Transcriptions Sneha Knight [[id number]] 11/14/2014  1:13 PM 11/12/2014 Lenora Marie MD  
Phone: [phone]  In [address] [place] [address] Josie Hadley MD [[id number]] 11/17/2014  9:37 AM   
 Lenora Marie MD  
 Fax: [phone] Phone: [phone] Fax Notes/Transcriptions Page Valeriano Eugene [[id number]] 11/21/2014  9:54 AM 11/17/2014 Brooke Aguila MD  
Fax: [phone] Phone: [phone] Fax Notes/Transcriptions Page Valeriano Eugene [[id number]] 11/21/2014  9:54 AM 11/17/2014 London Lang MD  
Phone: [phone] In Basket Note Review Brooke Aguila MD [[id number]] 10/14/2015  3:02 PM 10/14/2015

## 2018-06-15 NOTE — PROGRESS NOTES
1415:TRANSFER - IN REPORT:    Verbal report received from [facility]) on [facility]  being received from Cath Lab(unit) for routine progression of care      Report consisted of patients Situation, Background, Assessment and   Recommendations(SBAR). Information from the following report(s) SBAR, Kardex, Procedure Summary, Intake/Output, MAR, Recent Results, Med Rec Status and Cardiac Rhythm NSR was reviewed with the receiving nurse. Opportunity for questions and clarification was provided. Assessment completed upon patients arrival to unit and care assumed. 1530: Bedside shift change report given to [address] [place] [address] (oncoming nurse) by Jayce Pascual (offgoing nurse). Report included the following information SBAR, Kardex, Procedure Summary, Intake/Output, MAR, Recent Results, Med Rec Status and Cardiac Rhythm NSR.      Primary Nurse Earl Conklin RN and Louie Condon RN performed a dual skin assessment on this patient Impairment noted- see wound doc flow sheet  Mike score is 21, right groin cath site

## 2018-06-15 NOTE — PROCEDURES
Cardiac Catheterization Procedure Note   Patient: Barbara Carey Marker  MRN: [id number]  SSN: [id number]   YOB: 1955 Age: 58 y.o. Sex: male    Date of Procedure: 6/15/2018   Pre-procedure Diagnosis: Coronary Artery Disease  Post-procedure Diagnosis: Coronary Artery Disease  Procedure: PCI  :  Dr. Sinai Judd MD    Assistant(s):  None  Anesthesia: Moderate Sedation   Estimated Blood Loss: Less than 10 mL   Specimens Removed: None  Findings: 90% mid RCA treated with 2.5 x 15 [place] good result. Mynx to groin. Plavix begun.   Complications: None   Implants:  None  Signed by:  Sinai Judd MD  6/15/2018  11:06 AM

## 2018-06-15 NOTE — PROGRESS NOTES
Cardiac Cath Lab Recovery Arrival Note:      José Hernandez arrived to Cardiac Cath Lab, Recovery Area. Staff introduced to patient. Patient identifiers verified with NAME and DATE OF BIRTH. Procedure verified with patient. Consent forms reviewed and signed by patient or authorized representative and verified. Allergies verified. Patient and family oriented to department. Patient and family informed of procedure and plan of care. Questions answered with review. Patient prepped for procedure, per orders from physician, prior to arrival.    Patient on cardiac monitor, non-invasive blood pressure, SPO2 monitor. On room air. Patient is A&Ox 4. Patient reports no complaints of chest pain. Patient in stretcher, in low position, with side rails up, call bell within reach, patient instructed to call if assistance as needed. Patient prep in: [address], [place] [address].    Patient family has pager # 0  Family in: daughter in hospital.   Prep by: Declan River RN    Pre cath teaching completed

## 2018-06-15 NOTE — INTERVAL H&P NOTE
H&P Update:  Emerald Hernandez was seen and examined. History and physical has been reviewed.  Significant clinical changes have occurred as noted:    During last visit patient called me back to discuss with all 3 daughters and he now wants to proceed to cath  risks and benefits discussed  2/1000 serious life threatening risk includes stroke, heart attack leading to  death  1/100 prolong hospital stay above and bleeding, groin complications, infection, renals possible but  unlikely unless baseline renal dysfunction, tear in cardiac vessel, tamponade  PCI 6-1/801 similar complications but benefits likely outweigh risk     Signed By: La Hutchins MD     Brandee 15, 2018 9:27 AM

## 2018-06-15 NOTE — PROGRESS NOTES
Cardiac Cath Lab Procedure Area Arrival Note:    Chun Desai Marker arrived to Cardiac Cath Lab, Procedure Area. Patient identifiers verified with NAME and DATE OF BIRTH. Procedure verified with patient. Consent forms verified. Allergies verified. Patient informed of procedure and plan of care. Questions answered with review. Patient voiced understanding of procedure and plan of care. Patient on cardiac monitor, non-invasive blood pressure, SPO2 monitor. On O2 @ 2 lpm via NC.  IV of NS on pump at 314 ml/hr. Patient status doing well without problems. Patient is A&Ox 4. Patient reports no pain. Patient medicated during procedure with orders obtained and verified by Dr. Summer Bain.    Refer to patients Cardiac Cath Lab PROCEDURE REPORT for vital signs, assessment, status, and response during procedure, printed at end of case. Printed report on chart or scanned into chart. 1110:   TRANSFER - OUT REPORT:    Verbal report given to JANEE WillettT(name) on Edward A Marker  being transferred to (unit) for routine post - op       Report consisted of patients Situation, Background, Assessment and   Recommendations(SBAR). Information from the following report(s) SBAR, Procedure Summary and MAR was reviewed with the receiving nurse. Lines:   Peripheral IV 06/15/18 Left; Outer; Upper Forearm (Active)        Opportunity for questions and clarification was provided.       Patient transported with:   Registered Nurse and tech

## 2018-06-15 NOTE — PROGRESS NOTES
Dr Deng Montague in to see pt. Aware ankur t pt states that he had lab work done in [address] [facility] [place], [facility]  [phone]  Called unable to obtain results.   Informed labs drawn on arrival

## 2018-06-15 NOTE — IP AVS SNAPSHOT
2700 [facility] [address] [place] [address] 
[phone] Patient: Sonali Ludwig MRN: [id number] RUU:0/69/1951 About your hospitalization You were admitted on:  Brandee 15, 2018 You last received care in the:  [facility] You were discharged on:  June 16, 2018 Why you were hospitalized Your primary diagnosis was:  Not on File Your diagnoses also included:  Cad (Coronary Artery Disease), Native Coronary Artery, Status Post Angioplasty With Stent Follow-up Information Follow up With Details Comments Contact Info [address] [place] [address] Cardiac Rehab  Call if you are interested in participating an a cardiac rehab program. [phone] Phys Jessica, MD   Patient can only remember the practice name and not the physician Discharge Orders None A check chintan indicates which time of day the medication should be taken. My Medications START taking these medications Instructions Each Dose to Equal  
 Morning Noon Evening Bedtime  
 clopidogrel 75 mg Tab Commonly known as:  PLAVIX Your last dose was: Your next dose is: Take 1 Tab by mouth daily. 75 mg CONTINUE taking these medications Instructions Each Dose to Equal  
 Morning Noon Evening Bedtime  
 aspirin delayed-release 81 mg tablet Your last dose was: Your next dose is: Take  by mouth daily. atenolol 100 mg tablet Commonly known as:  TENORMIN Your last dose was: Your next dose is: Take 1 Tab by mouth every evening. 100 mg  
    
   
   
   
  
 atorvastatin 20 mg tablet Commonly known as:  LIPITOR Your last dose was: Your next dose is: TAKE 1 TABLET NIGHTLY. Blood-Glucose Meter monitoring kit Your last dose was: Your next dose is: Please dispense one Contour meter -diagnosis code 250.00- Check BS once daily. enalapril 10 mg tablet Commonly known as:  Sandra Almeida Your last dose was: Your next dose is: TAKE 1 TABLET TWICE A DAY  
     
   
   
   
  
 glucose blood VI test strips strip Commonly known as:  blood glucose test  
   
Your last dose was: Your next dose is:    
   
   
 Check blood sugar once daily Lancets Misc Your last dose was: Your next dose is:    
   
   
 Check BS once daily  
     
   
   
   
  
 metFORMIN  mg tablet Commonly known as:  GLUCOPHAGE XR Your last dose was: Your next dose is: TAKE 1 TABLET BEFORE BREAKFAST & DINNER  
     
   
   
   
  
 * nitroglycerin 0.2 mg/hr Commonly known as:  CBELULYF Your last dose was: Your next dose is:    
   
   
 1 Patch by TransDERmal route daily. Indications: Chronic Stable Angina Pectoris 1 Patch * nitroglycerin 0.4 mg SL tablet Commonly known as:  NITROSTAT Your last dose was: Your next dose is:    
   
   
 1 Tab by SubLINGual route every five (5) minutes as needed for Chest Pain. 0.4 mg  
    
   
   
   
  
 * Notice: This list has 2 medication(s) that are the same as other medications prescribed for you. Read the directions carefully, and ask your doctor or other care provider to review them with you. Where to Get Your Medications These medications were sent to [address] [place] [address], [address] [place] [address]., [facility] Phone:  [phone]  
  clopidogrel 75 mg Tab Discharge Instructions Do not take Metformin until Sunday evening Take Plavix 75 every day as a blood thinner to prevent clots in the stent for one year, you will continue your aspirin. ACO Transitions of Care Introducing [facility] offers a voluntary care coordination program to provide high quality service and care to [facility] fee-for-service beneficiaries. Reid Duvall was designed to help you enhance your health and well-being through the following services: ? Transitions of Care  support for individuals who are transitioning from one care setting to another (example: Hospital to home). ? Chronic and Complex Care Coordination  support for individuals and caregivers of those with serious or chronic illnesses or with more than one chronic (ongoing) condition and those who take a number of different medications. If you meet specific medical criteria, a [address] [place] [address] Rd may call you directly to coordinate your care with your primary care physician and your other care providers. For questions about the [facility] programs, please, contact your physicians office. For general questions or additional information about Accountable Care Organizations: 
Please visit www.medicare.gov/acos. html or call 1-800-MEDICARE (7-551.953.5350) TTY users should call 5-439.936.3193. Introducing [facility]! Dear Phillip Peña: Thank you for requesting a [employer] account. Our records indicate that you already have an active [employer] account. You can access your account anytime at [email or web address] Did you know that you can access your hospital and ER discharge instructions at any time in [employer]? You can also review all of your test results from your hospital stay or ER visit. Additional Information If you have questions, please visit the Frequently Asked Questions section of the [employer] website at [email or web address] Remember, [employer] is NOT to be used for urgent needs. For medical emergencies, dial 911. Now available from your iPhone and Android! Introducing Bradley Bonilla As a [facility] patient, I wanted to make you aware of our electronic visit tool called Bradley Bonilla. [employer] Oka 24/7 allows you to connect within minutes with a medical provider 24 hours a day, seven days a week via a mobile device or tablet or logging into a secure website from your computer. You can access Bradley Bonilla from anywhere in the [place]. A virtual visit might be right for you when you have a simple condition and feel like you just dont want to get out of bed, or cant get away from work for an appointment, when your regular [facility] provider is not available (evenings, weekends or holidays), or when youre out of town and need minor care. Electronic visits cost only $49 and if the [employer] Oka 24/7 provider determines a prescription is needed to treat your condition, one can be electronically transmitted to a nearby pharmacy*. Please take a moment to enroll today if you have not already done so. The enrollment process is free and takes just a few minutes. To enroll, please download the [employer] 24/7 melissa to your tablet or phone, or visit [email or web address] to enroll on your computer. And, as an [address] [place] [address] patient with a [employer] account, the results of your visits will be scanned into your electronic medical record and your primary care provider will be able to view the scanned results. We urge you to continue to see your regular [facility] provider for your ongoing medical care. And while your primary care provider may not be the one available when you seek a Bradley Bonilla virtual visit, the peace of mind you get from getting a real diagnosis real time can be priceless. For more information on Bradley Bonilla, view our Frequently Asked Questions (FAQs) at [email or web address]. Sincerely, 
 
Laurence Arteaga MD 
Chief Medical Officer [address] *:  certain medications cannot be prescribed via Bradley Bonilla Unresulted tests-please follow up with your PCP on these results Procedure/Test Authorizing Provider CBC WITH AUTOMATED DIFF Louis Levine MD  
 ECG RHYTHM ANALYSIS ADULT Louis Levine MD  
 EKG, 12 LEAD, INITIAL Louis Levine MD  
 METABOLIC PANEL, BASIC Louis Levine MD  
  
Providers Seen During Your Hospitalization Provider Specialty Primary office phone Louis Levine MD Cardiology [phone] Your Primary Care Physician (PCP) Primary Care Physician Office Phone Office Fax OTHER, PHYS ** None ** ** None ** You are allergic to the following Allergen Reactions Percocet (Oxycodone-Acetaminophen) Swelling Recent Documentation Height Weight BMI Smoking Status 1.753 m 105 kg 34.18 kg/m2 Former Smoker Emergency Contacts Name Discharge Info Relation Home Work Mobile Laurence Cuevas 11 CAREGIVER [3] Daughter [21] [phone] Dave Kong  Child [2] [phone] Patient Belongings The following personal items are in your possession at time of discharge: 
  Dental Appliances: None  Visual Aid: Glasses, With patient      Home Medications: None   Jewelry: None  Clothing: Pants, At bedside, Shirt    Other Valuables: Cell Phone, Eyeglasses, With patient Please provide this summary of care documentation to your next provider. Signatures-by signing, you are acknowledging that this After Visit Summary has been reviewed with you and you have received a copy. Patient Signature:  ____________________________________________________________ Date:  ____________________________________________________________  
  
Vaishali Solis Provider Signature:  ____________________________________________________________ Date:  ____________________________________________________________

## 2018-06-16 VITALS
HEART RATE: 62 BPM | BODY MASS INDEX: 34.29 KG/M2 | HEIGHT: 69 IN | WEIGHT: 231.48 LBS | TEMPERATURE: 98 F | OXYGEN SATURATION: 95 % | RESPIRATION RATE: 18 BRPM | DIASTOLIC BLOOD PRESSURE: 86 MMHG | SYSTOLIC BLOOD PRESSURE: 146 MMHG

## 2018-06-16 PROCEDURE — 99218 HC RM OBSERVATION: CPT

## 2018-06-16 PROCEDURE — 74011250637 HC RX REV CODE- 250/637: Performed by: SPECIALIST

## 2018-06-16 RX ORDER — ATENOLOL 50 MG/1
100 TABLET ORAL EVERY EVENING
Status: DISCONTINUED | OUTPATIENT
Start: 2018-06-16 | End: 2018-06-16 | Stop reason: HOSPADM

## 2018-06-16 RX ORDER — ATENOLOL 50 MG/1
50 TABLET ORAL EVERY EVENING
Status: DISCONTINUED | OUTPATIENT
Start: 2018-06-16 | End: 2018-06-16

## 2018-06-16 RX ADMIN — CLOPIDOGREL BISULFATE 75 MG: 75 TABLET ORAL at 08:20

## 2018-06-16 RX ADMIN — Medication 10 ML: at 06:00

## 2018-06-16 RX ADMIN — ENALAPRIL MALEATE 10 MG: 5 TABLET ORAL at 08:20

## 2018-06-16 RX ADMIN — ASPIRIN 81 MG: 81 TABLET, COATED ORAL at 08:20

## 2018-06-16 NOTE — PROGRESS NOTES
Cardiology Discharge Summary     Patient ID:  Emerald Toscano Marker  [id number]  38 y.o.  1955    Admit Date: 6/15/2018    Discharge Date: 6/16/2018     Admitting Physician: La Hutchins MD     Discharge Physician: Selena Alan MD    Admission Diagnoses: cc;CAD (coronary artery disease), native coronary artery; St*    Discharge Diagnoses: Active Problems:    CAD (coronary artery disease), native coronary artery (6/15/2018)      Status post angioplasty with stent (6/15/2018)        Discharge Condition: Good    Cardiology Procedures this Admission:  Left heart catheterization with PCI    Hospital Course: Cath for chest pain new stent to RCA, chest pain resolved. Consults: None    Discharge Exam:     Visit Vitals    /86 (BP 1 Location: Left arm, BP Patient Position: Sitting)    Pulse 62    Temp 98 °F (36.7 °C)    Resp 18    Ht 5' 9\" (1.753 m)    Wt 105 kg (231 lb 7.7 oz)    SpO2 95%    BMI 34.18 kg/m2     General Appearance:  Well developed, well nourished,alert and oriented x 3,  individual in no acute distress. Ears/Nose/Mouth/Throat:   Hearing grossly normal.         Neck: Supple. Chest:   Lungs clear to auscultation bilaterally. Cardiovascular:  Regular rate and rhythm, S1, S2 normal, no murmur. Abdomen:   Soft, non-tender, bowel sounds are active. Extremities: No edema bilaterally. Skin: Warm and dry. Disposition: home    Quality Care Documentation        Patient Instructions:   Current Discharge Medication List      START taking these medications    Details   clopidogrel (PLAVIX) 75 mg tab Take 1 Tab by mouth daily.   Qty: 90 Tab, Refills: 3         CONTINUE these medications which have NOT CHANGED    Details   enalapril (VASOTEC) 10 mg tablet TAKE 1 TABLET TWICE A DAY  Qty: 180 Tab, Refills: 1    Associated Diagnoses: Essential hypertension; Coronary artery disease involving coronary bypass graft of native heart with angina pectoris (HCC)      atenolol (TENORMIN) 100 mg tablet Take 1 Tab by mouth every evening. Qty: 30 Tab, Refills: 5    Comments: Dose increased 5/24/18  Associated Diagnoses: Coronary artery disease involving coronary bypass graft of native heart with angina pectoris ([facility] [address] Essential hypertension      metFORMIN ER (GLUCOPHAGE XR) 500 mg tablet TAKE 1 TABLET BEFORE BREAKFAST & DINNER  Qty: 60 Tab, Refills: 0    Comments: Past due for follow up appointment. Associated Diagnoses: Type 2 diabetes mellitus without complication, without long-term current use of insulin ([facility])      atorvastatin (LIPITOR) 20 mg tablet TAKE 1 TABLET NIGHTLY. Qty: 30 Tab, Refills: 5    Associated Diagnoses: Hypercholesteremia      aspirin delayed-release 81 mg tablet Take  by mouth daily. nitroglycerin (NITRODUR) 0.2 mg/hr 1 Patch by TransDERmal route daily. Indications: Chronic Stable Angina Pectoris  Qty: 90 Patch, Refills: 1    Associated Diagnoses: Coronary artery disease involving coronary bypass graft of native heart with angina pectoris ([facility])      nitroglycerin (NITROSTAT) 0.4 mg SL tablet 1 Tab by SubLINGual route every five (5) minutes as needed for Chest Pain. Qty: 1 Bottle, Refills: 1    Associated Diagnoses: Coronary artery disease involving coronary bypass graft of native heart with angina pectoris ([facility])      glucose blood VI test strips (BLOOD GLUCOSE TEST) strip Check blood sugar once daily  Qty: 50 Strip, Refills: 5    Comments: Please provide Contour strips      Blood-Glucose Meter monitoring kit Please dispense one Contour meter -diagnosis code 250.00- Check BS once daily. Qty: 1 Kit, Refills: 0      Lancets misc Check BS once daily  Qty: 1 Package, Refills: 11             Referenced discharge instructions provided by nursing for diet and activity.     Follow-up with Dr. Lauralee Aschoff.     Signed:  Morales Mckeon MD  6/16/2018  8:51 AM  [phone]  Cell

## 2018-06-16 NOTE — PROGRESS NOTES
1930- Bedside and Verbal shift change report given to Mildred (oncoming nurse) by [address] [place] [address] (offgoing nurse). Report included the following information SBAR, Kardex, MAR, Recent Results and Cardiac Rhythm NSR.     0730- Bedside and Verbal shift change report given to Lupe Deal (oncoming nurse) by Mildred (offgoing nurse). Report included the following information SBAR, Kardex, Intake/Output, MAR and Cardiac Rhythm Sinus Jessica Arellano. Problem: Falls - Risk of  Goal: *Absence of Falls  Document Ben Fall Risk and appropriate interventions in the flowsheet.    Outcome: Progressing Towards Goal  Fall Risk Interventions:            Medication Interventions: Teach patient to arise slowly, Patient to call before getting OOB

## 2018-06-16 NOTE — PROGRESS NOTES
Bedside shift change report given to Mildred (oncoming nurse) by Evin Gilmore (offgoing nurse). Report included the following information SBAR, Kardex, Procedure Summary, Intake/Output, MAR and Med Rec Status.

## 2018-06-16 NOTE — DISCHARGE INSTRUCTIONS
Do not take Metformin until Sunday evening  Take Plavix 75 every day as a blood thinner to prevent clots in the stent for one year, you will continue your aspirin.

## 2018-06-16 NOTE — PROGRESS NOTES
0730: Bedside shift change report given to Jayce Pascual (oncoming nurse) by Grover Gutierrez (offgoing nurse). Report included the following information SBAR, Kardex, Procedure Summary, Intake/Output, MAR, Accordion, Recent Results, Med Rec Status and Cardiac Rhythm Albino lorenzo. 0900:  DISCHARGE SUMMARY from Nurse    PATIENT INSTRUCTIONS:    After general anesthesia or intravenous sedation, for 24 hours or while taking prescription Narcotics:  · Limit your activities  · Do not drive and operate hazardous machinery  · Do not make important personal or business decisions  · Do  not drink alcoholic beverages  · If you have not urinated within 8 hours after discharge, please contact your surgeon on call. Report the following to your surgeon:  · Excessive pain, swelling, redness or odor of or around the surgical area  · Temperature over 100.5  · Nausea and vomiting lasting longer than 4 hours or if unable to take medications  · Any signs of decreased circulation or nerve impairment to extremity: change in color, persistent  numbness, tingling, coldness or increase pain  · Any questions    What to do at Home:  Recommended activity: see d/c instructions     *  Please give a list of your current medications to your Primary Care Provider. *  Please update this list whenever your medications are discontinued, doses are      changed, or new medications (including over-the-counter products) are added. *  Please carry medication information at all times in case of emergency situations. These are general instructions for a healthy lifestyle:    No smoking/ No tobacco products/ Avoid exposure to second hand smoke  Surgeon General's Warning:  Quitting smoking now greatly reduces serious risk to your health.     Obesity, smoking, and sedentary lifestyle greatly increases your risk for illness    A healthy diet, regular physical exercise & weight monitoring are important for maintaining a healthy lifestyle    You may be retaining fluid if you have a history of heart failure or if you experience any of the following symptoms:  Weight gain of 3 pounds or more overnight or 5 pounds in a week, increased swelling in our hands or feet or shortness of breath while lying flat in bed. Please call your doctor as soon as you notice any of these symptoms; do not wait until your next office visit. Recognize signs and symptoms of STROKE:    F-face looks uneven    A-arms unable to move or move unevenly    S-speech slurred or non-existent    T-time-call 911 as soon as signs and symptoms begin-DO NOT go       Back to bed or wait to see if you get better-TIME IS BRAIN. Warning Signs of HEART ATTACK     Call 911 if you have these symptoms:   Chest discomfort. Most heart attacks involve discomfort in the center of the chest that lasts more than a few minutes, or that goes away and comes back. It can feel like uncomfortable pressure, squeezing, fullness, or pain.  Discomfort in other areas of the upper body. Symptoms can include pain or discomfort in one or both arms, the back, neck, jaw, or stomach.  Shortness of breath with or without chest discomfort.  Other signs may include breaking out in a cold sweat, nausea, or lightheadedness. Don't wait more than five minutes to call [address]! Fast action can save your life. Calling 911 is almost always the fastest way to get lifesaving treatment. Emergency Medical Services staff can begin treatment when they arrive  up to an hour sooner than if someone gets to the hospital by car. The discharge information has been reviewed with the patient and daughter. The patient verbalized understanding. Discharge medications reviewed with the patient and daughter and appropriate educational materials and side effects teaching were provided. Right groin site clean, dry, no bleeding, no hematoma .  Tele and IV removed ___________________________________________________________________________________________________________________________________

## 2018-06-18 LAB
ATRIAL RATE: 56 BPM
CALCULATED P AXIS, ECG09: 61 DEGREES
CALCULATED R AXIS, ECG10: 1 DEGREES
CALCULATED T AXIS, ECG11: 81 DEGREES
DIAGNOSIS, 93000: NORMAL
P-R INTERVAL, ECG05: 154 MS
Q-T INTERVAL, ECG07: 422 MS
QRS DURATION, ECG06: 94 MS
QTC CALCULATION (BEZET), ECG08: 407 MS
VENTRICULAR RATE, ECG03: 56 BPM

## 2018-06-28 ENCOUNTER — TELEPHONE (OUTPATIENT)
Dept: CARDIOLOGY CLINIC | Age: 63
End: 2018-06-28

## 2018-06-28 NOTE — TELEPHONE ENCOUNTER
Verified patient with two types of identifiers. Spoke with patient. Patient reported 1 nose bleed. Was able to stop bleeding easily. Instructed patient to continue medication as prescribe and to contact the office if bleeding became more frequent or if he was unable to stop bleeding. Verbalizes understanding. And will call with any questions or concerns.

## 2018-06-28 NOTE — TELEPHONE ENCOUNTER
Verified patient with two types of identifiers. Patient's daughter reports that pt called to tell her that he had a nose bleed last night. Concern because patient is on both Plavix and ASA Will contact patient for more details.

## 2018-06-28 NOTE — TELEPHONE ENCOUNTER
Patient's daughter Jefferson Marx called in and stated that her father has been having some nosebleeds and wants to know if he should be taking Aspirin & Plavix together.   Phone [phone]  Rebekah Carmen

## 2018-07-17 ENCOUNTER — TELEPHONE (OUTPATIENT)
Dept: CARDIOLOGY CLINIC | Age: 63
End: 2018-07-17

## 2018-07-17 NOTE — TELEPHONE ENCOUNTER
Patient was in the ER at [facility] in [address]  yesterday and would like to know if we received those notes.   Phone: [phone]

## 2018-07-17 NOTE — TELEPHONE ENCOUNTER
spoke with NP Mayra Salgado re: this patient and was told to add the patient to 's tomorrow. Kaushik Ramos will fax records along with EKG done today.

## 2018-07-18 ENCOUNTER — OFFICE VISIT (OUTPATIENT)
Dept: CARDIOLOGY CLINIC | Age: 63
End: 2018-07-18

## 2018-07-18 VITALS
HEART RATE: 64 BPM | WEIGHT: 232 LBS | OXYGEN SATURATION: 98 % | HEIGHT: 69 IN | SYSTOLIC BLOOD PRESSURE: 180 MMHG | DIASTOLIC BLOOD PRESSURE: 100 MMHG | RESPIRATION RATE: 16 BRPM | BODY MASS INDEX: 34.36 KG/M2

## 2018-07-18 DIAGNOSIS — R07.9 CHEST PAIN, UNSPECIFIED TYPE: ICD-10-CM

## 2018-07-18 DIAGNOSIS — R68.84 JAW PAIN: ICD-10-CM

## 2018-07-18 DIAGNOSIS — I25.700 CORONARY ARTERY DISEASE INVOLVING CORONARY BYPASS GRAFT OF NATIVE HEART WITH UNSTABLE ANGINA PECTORIS (HCC): ICD-10-CM

## 2018-07-18 DIAGNOSIS — I10 MALIGNANT HYPERTENSION: ICD-10-CM

## 2018-07-18 DIAGNOSIS — I25.110 CORONARY ARTERY DISEASE INVOLVING NATIVE CORONARY ARTERY OF NATIVE HEART WITH UNSTABLE ANGINA PECTORIS (HCC): Primary | ICD-10-CM

## 2018-07-18 RX ORDER — ISOSORBIDE MONONITRATE 30 MG/1
30 TABLET, EXTENDED RELEASE ORAL DAILY
COMMUNITY
End: 2018-07-18 | Stop reason: SDUPTHER

## 2018-07-18 RX ORDER — ISOSORBIDE MONONITRATE 30 MG/1
30 TABLET, EXTENDED RELEASE ORAL DAILY
Qty: 30 TAB | Refills: 3 | Status: SHIPPED | OUTPATIENT
Start: 2018-07-18

## 2018-07-18 RX ORDER — PRASUGREL 10 MG/1
10 TABLET, FILM COATED ORAL DAILY
Qty: 30 TAB | Refills: 3 | Status: SHIPPED | OUTPATIENT
Start: 2018-07-18 | End: 2018-08-17 | Stop reason: SDUPTHER

## 2018-07-18 RX ORDER — PRASUGREL 10 MG/1
10 TABLET, FILM COATED ORAL DAILY
COMMUNITY
End: 2018-07-18 | Stop reason: SDUPTHER

## 2018-07-18 NOTE — MR AVS SNAPSHOT
[address] [facility] [address] [place] 
[phone] Patient: Aleksander Lewis MRN: [id number] [facility]:[birth year] Visit Information Date & Time Provider Department Dept. Phone Encounter #  
 7/18/2018  3:20 PM Bell Escalante MD [facility] [phone] [id number] Your Appointments 8/29/2018 10:00 AM  
ESTABLISHED PATIENT with Bell Escalante MD  
[facility] ([place] SCHEDULING) Appt Note: fu per daughter confirmed location [address] [facility] [address] [facility]  
[phone]  
  
   
 [address] [facility] [address] [facility] [address]  
  
    
 9/12/2018 11:00 AM  
ESTABLISHED PATIENT with Bell Escalante MD  
[facility] ([facility]) Appt Note: f/u  
 [address]   
[phone]  
  
   
 [address]   
  
    
 10/4/2018 10:00 AM  
ESTABLISHED PATIENT with Bell Escalante MD  
[facility] ([place] SCHEDULING) Appt Note: 4 month f/u  
 [address] [place] [address]  
[address] [facility],[address] [address] Upcoming Health Maintenance Date Due DTaP/Tdap/Td series (1 - Tdap) 4/20/1975 EYE EXAM RETINAL OR DILATED Q1 12/5/2015 HEMOGLOBIN A1C Q6M 1/11/2018 MICROALBUMIN Q1 1/24/2018 LIPID PANEL Q1 1/24/2018 MEDICARE YEARLY EXAM 3/14/2018 FOOT EXAM Q1 7/19/2018 Influenza Age 5 to Adult 8/1/2018 COLONOSCOPY 12/5/2024 Allergies as of 7/18/2018  Review Complete On: 7/18/2018 By: Kumar Byrne Severity Noted Reaction Type Reactions Percocet [Oxycodone-acetaminophen]  11/11/2014    Swelling Current Immunizations  Never Reviewed No immunizations on file. Not reviewed this visit Vitals BP Pulse Resp Height(growth percentile) Weight(growth percentile) SpO2  
 (!) 180/100 (BP 1 Location: Left arm, BP Patient Position: Sitting) 64 16 5' 9\" (1.753 m) 232 lb (105.2 kg) 98% BMI Smoking Status 34.26 kg/m2 Former Smoker BMI and BSA Data Body Mass Index Body Surface Area  
 34.26 kg/m 2 2.26 m 2 Preferred Pharmacy Pharmacy Name Phone Dianne Salazar, [address] [facility]. [phone] Your Updated Medication List  
  
   
This list is accurate as of 7/18/18  4:35 PM.  Always use your most recent med list.  
  
  
  
  
 aspirin delayed-release 81 mg tablet Take  by mouth daily. atenolol 100 mg tablet Commonly known as:  TENORMIN Take 1 Tab by mouth every evening. atorvastatin 20 mg tablet Commonly known as:  LIPITOR  
TAKE 1 TABLET NIGHTLY. Blood-Glucose Meter monitoring kit Please dispense one Contour meter -diagnosis code 250.00- Check BS once daily. EFFIENT 10 mg tablet Generic drug:  prasugrel Take 10 mg by mouth daily. enalapril 10 mg tablet Commonly known as:  VASOTEC  
TAKE 1 TABLET TWICE A DAY  
  
 glucose blood VI test strips strip Commonly known as:  blood glucose test  
Check blood sugar once daily  
  
 isosorbide mononitrate ER 30 mg tablet Commonly known as:  IMDUR Take 30 mg by mouth daily. Lancets Misc Check BS once daily  
  
 metFORMIN  mg tablet Commonly known as:  GLUCOPHAGE XR  
TAKE 1 TABLET BEFORE BREAKFAST & DINNER  
  
 * nitroglycerin 0.2 mg/hr Commonly known as:  NITRODUR  
1 Patch by TransDERmal route daily. Indications: Chronic Stable Angina Pectoris * nitroglycerin 0.4 mg SL tablet Commonly known as:  NITROSTAT  
1 Tab by SubLINGual route every five (5) minutes as needed for Chest Pain. * Notice: This list has 2 medication(s) that are the same as other medications prescribed for you.  Read the directions carefully, and ask your doctor or other care provider to review them with you. Patient Instructions Stop Plavix Start Effient 10 mg daily Start Imdur 30 mg every morning BP check every day CTA chest [facility]  will call with date and time) Follow up with  at [facility] office in 2-3 weeks Introducing [facility]! Dear Mauricio Chu: Thank you for requesting a [employer] account. Our records indicate that you already have an active [employer] account. You can access your account anytime at [email or web address] Did you know that you can access your hospital and ER discharge instructions at any time in [employer]? You can also review all of your test results from your hospital stay or ER visit. Additional Information If you have questions, please visit the Frequently Asked Questions section of the [employer] website at [email or web address] Remember, [employer] is NOT to be used for urgent needs. For medical emergencies, dial 911. Now available from your iPhone and Android! Please provide this summary of care documentation to your next provider. Your primary care clinician is listed as Terrie Duvall. If you have any questions after today's visit, please call [phone].

## 2018-07-18 NOTE — PROGRESS NOTES
Brookesly Dye Marker     1955       Palma Rolon MD, [facility]  Date of Visit-7/18/2018   PCP is Clemencia Bence, MD   [facility] [place]  [facility]  HPI:  Candice Bey is a 58 y.o. male   Pt with CAD had a 90% mid RCA lesion, 6/15/18 with prior CABG in 2014. Now has recurrent chest pain. EKG 7/17 shows sinus rhythm with T-wave inversions from [facility], there is also prior inferior infarction. Pt was in [place] on 7/16 with chest pain for 2 days. Troponin was 0. The pt states that he was not feeling better after his stent. He notes that when he gets up sometimes he has some pressure and pain in his chest and then this stops. The pt reports that for a day or two after his procedure he didn't experience pressure but then he started to have neck pain, nausea and chest pressure again. He reports that he also was dizzy and had a nose bleed during this time. The pt began having chest pain again and so he went to the ED. He states that he went to the ED because his chest pain was worse than before, he was dizzy upon standing and he felt like he was going to pass out. The pt's blood pressure at that time was 285/80 and his sugar was 218. After serial troponin's were normal he was discharged from the ED. The pt is also complaining of left jaw pain and left arm numbness. They inquired if this could be an allergic reaction to the plavix as this was the only new medication started. The pt has not been monitoring his blood pressure since his cath so only knows that it has started to become elevated since Friday. He states that the pressure he was feeling prior to his stents is somewhat better. Denies edema, syncope or shortness of breath at rest, has no tachycardia, palpitations or sense of arrhythmia.     EKG from [facility] sent , dated 5-13 to me it shows= NSR and inferior MI, with inverted septal T wave  Extensive records from [facility] reviewed   Assessment/Plan:     1. Chest pain CAD recent stent had good result, chest pressure is gone, not clear that this is cardiac since he had a normal troponin. W  ----ll add imdur 30 mg, may need to have a repeat cath. Since trop with pain were normal , will hold off on cath, but if pain recurs then likely will need    2. HTN labile, suggest he keep BP diary at home, imdur may help    3. Right sided chest pain, check for PE    4. Change plavix to effient     5. Follow up in 3-4 weeks. Future Appointments  Date Time Provider Stephanie Javier   8/15/2018 11:20 AM Lizzeth Diaz MD [address] [facility]   8/29/2018 10:00 AM MD SARAH Evans SCHED   9/12/2018 11:00 AM MD ROSY Evans SCHED   10/4/2018 10:00 AM Lizzeth Diaz  [address]       Patient Instructions   Stop Plavix    Start Effient 10 mg daily    Start Imdur 30 mg every morning    BP check every day    CTA chest [facility]  will call with date and time)    Follow up with  at [facility] office in 2-3 weeks     Key CAD CHF Meds             isosorbide mononitrate ER (IMDUR) 30 mg tablet  (Taking/Discontinued) Take 30 mg by mouth daily. prasugrel (EFFIENT) 10 mg tablet  (Taking/Discontinued) Take 10 mg by mouth daily. nitroglycerin (NITRODUR) 0.2 mg/hr  (Taking) 1 Patch by TransDERmal route daily. Indications: Chronic Stable Angina Pectoris    enalapril (VASOTEC) 10 mg tablet  (Taking) TAKE 1 TABLET TWICE A DAY    nitroglycerin (NITROSTAT) 0.4 mg SL tablet  (Taking) 1 Tab by SubLINGual route every five (5) minutes as needed for Chest Pain. atenolol (TENORMIN) 100 mg tablet  (Taking) Take 1 Tab by mouth every evening. atorvastatin (LIPITOR) 20 mg tablet  (Taking) TAKE 1 TABLET NIGHTLY. aspirin delayed-release 81 mg tablet  (Taking) Take  by mouth daily. Impression:   1.  Coronary artery disease involving native coronary artery of native heart with unstable angina pectoris ([facility]    2. Coronary artery disease involving coronary bypass graft of native heart with unstable angina pectoris ([facility]    3. Malignant hypertension    4. Chest pain, unspecified type    5. Jaw pain       Cardiac History:   CABG 11-12-14= LIMA to LAD, SVG to OM1,OM2    TTE 11/5/14 LVEF 55 % to 60 %. No WMA. .    Cath 11/11/14 RCA patent stent, mLCX 80, OM1 mid 80 LAD- 85 tubular at ostium EF60%  There was significant 2-vessel coronary artery disease (LAD and  circumflex). Ostial LAD: There was a tubular 85 % stenosis. Mid LAD: There was a discrete 80 % stenosis. Distal LAD: Angiography showed mild atherosclerosis. 1st obtuse marginal: There was a discrete 80 % stenosis at the ostium of  the vessel segment. 2nd obtuse marginal: There was a discrete 50 %  stenosis at the ostium of the vessel segment. Left AV groove artery: There  was a tubular 80 % stenosis at the ostium of the vessel segment. Right PDA: Angiography showed moderate atherosclerosis. CARDIAC STRUCTURES:  Global left ventricular function was normal. EF estimated was 60 %. Review of Systems   Constitutional: Negative for fever and weight loss. Respiratory: Positive for shortness of breath. Cardiovascular: Negative for chest pain, palpitations and leg swelling. Negative for tachycardia, claudication   Gastrointestinal: Positive for abdominal pain. Negative for blood in stool and nausea. Genitourinary: Negative for hematuria. Musculoskeletal: Positive for joint pain. Neurological: Positive for dizziness. see supplement sheet, initialed and to be scanned by staff  Past Medical History:   Diagnosis Date    CAD (coronary artery disease)     stent to RCA [place];CABG 11-12-14    DM2 (diabetes mellitus, type 2) ([facility]     Hypercholesteremia     Hypertension       Social Hx= reports that he has quit smoking. He has never used smokeless tobacco. He reports that he does not drink alcohol or use illicit drugs.      Exam and Labs: Visit Vitals    BP (!) 180/100 (BP 1 Location: Left arm, BP Patient Position: Sitting)    Pulse 64    Resp 16    Ht 5' 9\" (1.753 m)    Wt 232 lb (105.2 kg)    SpO2 98%    BMI 34.26 kg/m2    Constitutional:  NAD, comfortable  Head: NC,AT. Eyes: No scleral icterus. Neck:  Neck supple. No JVD present. Throat: moist mucous membranes. Chest: Effort normal & normal respiratory excursion . Neurological: alert, conversant and oriented . Skin: Skin is not cold. No obvious systemic rash noted. Not diaphoretic. No erythema. Psychiatric:  Grossly normal mood and affect. Behavior appears normal. Extremities:  no clubbing or cyanosis. Abdomen: non distended    Lungs:breath sounds normal. No stridor. distress, wheezes or  Rales. Heart: normal rate, regular rhythm, normal S1, S2, no murmurs, rubs, clicks or gallops , PMI non displaced. Edema: Edema is none. Lab Results   Component Value Date/Time    Cholesterol, total 163 01/24/2017 09:00 AM    HDL Cholesterol 39 (L) 01/24/2017 09:00 AM    LDL, calculated 95 01/24/2017 09:00 AM    Triglyceride 146 01/24/2017 09:00 AM     Lab Results   Component Value Date/Time    Sodium 140 06/15/2018 08:51 AM    Potassium 4.3 06/15/2018 08:51 AM    Chloride 106 06/15/2018 08:51 AM    CO2 26 06/15/2018 08:51 AM    Anion gap 8 06/15/2018 08:51 AM    Glucose 238 (H) 06/15/2018 08:51 AM    BUN 13 06/15/2018 08:51 AM    Creatinine 0.88 06/15/2018 08:51 AM    BUN/Creatinine ratio 15 06/15/2018 08:51 AM    GFR est AA >60 06/15/2018 08:51 AM    GFR est non-AA >60 06/15/2018 08:51 AM    Calcium 8.9 06/15/2018 08:51 AM      Wt Readings from Last 3 Encounters:   06/16/18 231 lb 7.7 oz (105 kg)   06/04/18 233 lb 6.4 oz (105.9 kg)   05/24/18 234 lb (106.1 kg)      BP Readings from Last 3 Encounters:   06/16/18 146/86   06/04/18 140/70   05/24/18 138/80      Current Outpatient Prescriptions   Medication Sig    clopidogrel (PLAVIX) 75 mg tab Take 1 Tab by mouth daily.     nitroglycerin (NITRODUR) 0.2 mg/hr 1 Patch by TransDERmal route daily. Indications: Chronic Stable Angina Pectoris    enalapril (VASOTEC) 10 mg tablet TAKE 1 TABLET TWICE A DAY    nitroglycerin (NITROSTAT) 0.4 mg SL tablet 1 Tab by SubLINGual route every five (5) minutes as needed for Chest Pain.  atenolol (TENORMIN) 100 mg tablet Take 1 Tab by mouth every evening.  metFORMIN ER (GLUCOPHAGE XR) 500 mg tablet TAKE 1 TABLET BEFORE BREAKFAST & DINNER    atorvastatin (LIPITOR) 20 mg tablet TAKE 1 TABLET NIGHTLY.  aspirin delayed-release 81 mg tablet Take  by mouth daily.  glucose blood VI test strips (BLOOD GLUCOSE TEST) strip Check blood sugar once daily    Blood-Glucose Meter monitoring kit Please dispense one Contour meter -diagnosis code 250.00- Check BS once daily.  Lancets misc Check BS once daily     No current facility-administered medications for this visit. Impression see above.       Written by Olivia Vick, as dictated by Melanie Ahn MD.

## 2018-07-18 NOTE — PATIENT INSTRUCTIONS
Stop Plavix    Start Effient 10 mg daily    Start Imdur 30 mg every morning    BP check every day    CTA chest [facility]  will call with date and time)    Follow up with  at [facility] office in 2-3 weeks

## 2018-07-23 ENCOUNTER — TELEPHONE (OUTPATIENT)
Dept: CARDIOLOGY CLINIC | Age: 63
End: 2018-07-23

## 2018-07-23 NOTE — TELEPHONE ENCOUNTER
Received call from patient's daughter Kyra Barcenas, patient ID verified using two patient identifiers. She states patient is agreeable to having CT of chest done at [address] [place] [address] her that I would have  call her to schedule CT. She is agreeable to this plan.

## 2018-07-23 NOTE — TELEPHONE ENCOUNTER
Returned call, no answer. Left message to have Ebony Clark patient's daughter , return call to [phone].

## 2018-07-25 ENCOUNTER — HOSPITAL ENCOUNTER (OUTPATIENT)
Dept: CT IMAGING | Age: 63
Discharge: HOME | End: 2018-07-25
Attending: SPECIALIST
Payer: MEDICARE

## 2018-07-25 DIAGNOSIS — R07.9 CHEST PAIN, UNSPECIFIED TYPE: ICD-10-CM

## 2018-07-25 PROCEDURE — 71275 CT ANGIOGRAPHY CHEST: CPT

## 2018-07-25 PROCEDURE — 74011000258 HC RX REV CODE- 258: Performed by: SPECIALIST

## 2018-07-25 PROCEDURE — 74011636320 HC RX REV CODE- 636/320: Performed by: SPECIALIST

## 2018-07-25 RX ORDER — SODIUM CHLORIDE 9 MG/ML
50 INJECTION, SOLUTION INTRAVENOUS
Status: COMPLETED | OUTPATIENT
Start: 2018-07-25 | End: 2018-07-25

## 2018-07-25 RX ORDER — SODIUM CHLORIDE 0.9 % (FLUSH) 0.9 %
10 SYRINGE (ML) INJECTION
Status: COMPLETED | OUTPATIENT
Start: 2018-07-25 | End: 2018-07-25

## 2018-07-25 RX ADMIN — Medication 10 ML: at 11:57

## 2018-07-25 RX ADMIN — IOPAMIDOL 80 ML: 755 INJECTION, SOLUTION INTRAVENOUS at 11:57

## 2018-07-25 RX ADMIN — SODIUM CHLORIDE 50 ML/HR: 900 INJECTION, SOLUTION INTRAVENOUS at 11:57

## 2018-07-26 ENCOUNTER — TELEPHONE (OUTPATIENT)
Dept: CARDIOLOGY CLINIC | Age: 63
End: 2018-07-26

## 2018-07-26 NOTE — PROGRESS NOTES
No PE good news  Small nodule, repeat CT in 8 weeks  Is he having any more chest pain?   8/15/2018  11:20 AM   MD SARAH Geiger SCHED  8/29/2018  10:00 AM   MD SARAH Geiger SCHED  9/12/2018  11:00 AM   MD SANA Geiger          [place] SCHED  10/4/2018  10:00 AM   Huma Ingram MD        [place] [address]

## 2018-07-26 NOTE — TELEPHONE ENCOUNTER
Spoke to patient's daughter, Jeanelle Opitz.  Name noted on HIPPA form. Identifiers x 2. Informed of CT results and request for follow up CT regarding nodule. States patient being seen by lung specialist for nodule. States had CT in June/2018 in addition to CT on 7-25-18. States patient continues to complain of periodic chest pain, not as severe. States overall feeling better with new medication regimen. Confirmed follow up appointment on 8-15-18.

## 2018-07-26 NOTE — TELEPHONE ENCOUNTER
----- Message from Rustam Alvarado LPN sent at 1/52/4155  8:42 AM EDT -----      ----- Message -----     From: Alex Beauchamp MD     Sent: 7/25/2018  11:05 PM       To: Rustam Alvarado LPN    No PE good news  Small nodule, repeat CT in 8 weeks  Is he having any more chest pain?   8/15/2018  11:20 AM   MD SARAH Garrison SCHED  8/29/2018  10:00 AM   MD SARAH Garrison SCHED  9/12/2018  11:00 AM   Alex Beauchamp MD        [facility]  10/4/2018  10:00 AM   Alex Beauchamp MD        [place] [address]

## 2018-08-15 ENCOUNTER — OFFICE VISIT (OUTPATIENT)
Dept: CARDIOLOGY CLINIC | Age: 63
End: 2018-08-15

## 2018-08-15 VITALS
WEIGHT: 231.3 LBS | BODY MASS INDEX: 34.26 KG/M2 | OXYGEN SATURATION: 94 % | RESPIRATION RATE: 18 BRPM | HEART RATE: 64 BPM | DIASTOLIC BLOOD PRESSURE: 82 MMHG | SYSTOLIC BLOOD PRESSURE: 142 MMHG | HEIGHT: 69 IN

## 2018-08-15 DIAGNOSIS — Z95.1 S/P CABG X 3: ICD-10-CM

## 2018-08-15 DIAGNOSIS — I25.708 CORONARY ARTERY DISEASE OF BYPASS GRAFT OF NATIVE HEART WITH STABLE ANGINA PECTORIS (HCC): Primary | ICD-10-CM

## 2018-08-15 DIAGNOSIS — Z95.820 STATUS POST ANGIOPLASTY WITH STENT: ICD-10-CM

## 2018-08-15 DIAGNOSIS — I10 ESSENTIAL HYPERTENSION: ICD-10-CM

## 2018-08-15 DIAGNOSIS — E78.00 HYPERCHOLESTEREMIA: ICD-10-CM

## 2018-08-15 RX ORDER — AMLODIPINE BESYLATE 5 MG/1
5 TABLET ORAL DAILY
Qty: 30 TAB | Refills: 5 | Status: SHIPPED | OUTPATIENT
Start: 2018-08-15

## 2018-08-15 RX ORDER — AMLODIPINE BESYLATE 2.5 MG/1
5 TABLET ORAL DAILY
Qty: 30 TAB | Refills: 5 | Status: SHIPPED | OUTPATIENT
Start: 2018-08-15 | End: 2018-08-15 | Stop reason: SDUPTHER

## 2018-08-15 NOTE — PROGRESS NOTES
Shirlene Meza Marker     1955       Palma Johnson MD, [facility]  Date of Visit-8/15/2018   PCP is Urbano Olea MD   [facility] [place]  [facility]  HPI:  Cristóbal Baker is a 58 y.o. male   Pt with prior CABG 2014 had PCI of mid RCA 90% lesion 6/15/18. Since then more chest pain and on 7/18 added Imdur. CT of the chest showed no PE. Overall the pt states he is doing better. Pt states that the chest pain and breathing issues has been gone since change of Imdur  Pt states that he is trying to get back to leisure exercising and reports no abnormal feelings when doing these activities. Home BP elevated 130 - 160. Denies chest pain, edema, syncope or shortness of breath at rest, has no tachycardia, palpitations or sense of arrhythmia. Below is RCA lesion from cath in June   The Circ with severe dz but sequential to both OMs was open    Assessment/Plan:     1. CAD  --angina has improved with Imdur. He has limited activity but now can increase  --continue BB, ACE , Imdur, statin ASA + Effient for DAPT (dual anti-platelet therapy)   --use NTG SL prn      2. HTN -elevated-above and CCB  BP Readings from Last 3 Encounters:   08/15/18 142/82   07/18/18 (!) 180/100   06/16/18 146/86     will add norvasc 5 mg daily. 3.  Lipids on high potency statin as appropriate for secondary prevention. Lab Results   Component Value Date/Time    LDL, calculated 95 01/24/2017 09:00 AM    repeat labs     4. DM2 main cardiovascular disease risk factors   On Metformin, consider Jardiance addition  Lab Results   Component Value Date/Time    Hemoglobin A1c 8.1 (H) 10/14/2016 08:58 AM    Hemoglobin A1c (POC) 8.1 07/11/2017 08:52 AM    5. Right sided chest pain gone, no PE on CT  Follow up 3 mo in my office at [facility].     Future Appointments  Date Time Provider Stephanie Javier   11/14/2018 1:40 PM Kiarra Jauregui MD [address]      Patient Instructions   Please schedule an appointment for 3 months at [facility] office and cancel all other appointments that have been scheduled. Start taking Norvasc 5 Mg daily. Key CAD CHF Meds             amLODIPine (NORVASC) 5 mg tablet  (Taking) Take 1 Tab by mouth daily. isosorbide mononitrate ER (IMDUR) 30 mg tablet  (Taking) Take 1 Tab by mouth daily. prasugrel (EFFIENT) 10 mg tablet  (Taking/Discontinued) Take 1 Tab by mouth daily. nitroglycerin (NITRODUR) 0.2 mg/hr  (Taking) 1 Patch by TransDERmal route daily. Indications: Chronic Stable Angina Pectoris    enalapril (VASOTEC) 10 mg tablet  (Taking) TAKE 1 TABLET TWICE A DAY    nitroglycerin (NITROSTAT) 0.4 mg SL tablet  (Taking) 1 Tab by SubLINGual route every five (5) minutes as needed for Chest Pain. atenolol (TENORMIN) 100 mg tablet  (Taking/Discontinued) Take 1 Tab by mouth every evening. atorvastatin (LIPITOR) 20 mg tablet  (Taking) TAKE 1 TABLET NIGHTLY. aspirin delayed-release 81 mg tablet  (Taking) Take  by mouth daily. Impression:   1. Coronary artery disease of bypass graft of native heart with stable angina pectoris ([address]    2. Hypercholesteremia    3. Essential hypertension    4. S/P CABG x 3    5. Status post angioplasty with stent       Cardiac History:   CABG 11-12-14= LIMA to LAD, SVG to OM1,OM2    TTE 11/5/14 LVEF 55 % to 60 %. No WMA. .    Cath 11/11/14 RCA patent stent, mLCX 80, OM1 mid 80 LAD- 85 tubular at ostium EF60%  There was significant 2-vessel coronary artery disease (LAD and  circumflex). Ostial LAD: There was a tubular 85 % stenosis. Mid LAD: There was a discrete 80 % stenosis. Distal LAD: Angiography showed mild atherosclerosis. 1st obtuse marginal: There was a discrete 80 % stenosis at the ostium of  the vessel segment. 2nd obtuse marginal: There was a discrete 50 %  stenosis at the ostium of the vessel segment. Left AV groove artery: There  was a tubular 80 % stenosis at the ostium of the vessel segment.   Right PDA: Angiography showed moderate atherosclerosis. CARDIAC STRUCTURES:  Global left ventricular function was normal. EF estimated was 60 %. ROS-except as noted above. . A complete cardiac and respiratory are reviewed and negative except as above ; Resp-denies wheezing  or productive cough,. Const- No unusual weight loss or fever; Neuro-no recent seizure or CVA ; GI- No BRBPR, abdom pain, bloating ; - no  hematuria   see supplement sheet, initialed and to be scanned by staff  Past Medical History:   Diagnosis Date    CAD (coronary artery disease)     stent to [facility];CABG 11-12-14    DM2 (diabetes mellitus, type 2) ([facility]     Hypercholesteremia     Hypertension       Social Hx= reports that he has quit smoking. He has never used smokeless tobacco. He reports that he does not drink alcohol or use illicit drugs. Exam and Labs: There were no vitals taken for this visit. Constitutional:  NAD, comfortable  Head: NC,AT. Eyes: No scleral icterus. Neck:  Neck supple. No JVD present. Throat: moist mucous membranes. Chest: Effort normal & normal respiratory excursion . Neurological: alert, conversant and oriented . Skin: Skin is not cold. No obvious systemic rash noted. Not diaphoretic. No erythema. Psychiatric:  Grossly normal mood and affect. Behavior appears normal. Extremities:  no clubbing or cyanosis. Abdomen: non distended    Lungs:breath sounds normal. No stridor. distress, wheezes or  Rales. Heart: normal rate, regular rhythm, normal S1, S2, no murmurs, rubs, clicks or gallops , PMI non displaced. Edema: Edema is none.   Lab Results   Component Value Date/Time    Cholesterol, total 163 01/24/2017 09:00 AM    HDL Cholesterol 39 (L) 01/24/2017 09:00 AM    LDL, calculated 95 01/24/2017 09:00 AM    Triglyceride 146 01/24/2017 09:00 AM     Lab Results   Component Value Date/Time    Sodium 140 06/15/2018 08:51 AM    Potassium 4.3 06/15/2018 08:51 AM    Chloride 106 06/15/2018 08:51 AM    CO2 26 06/15/2018 08:51 AM    Anion gap 8 06/15/2018 08:51 AM    Glucose 238 (H) 06/15/2018 08:51 AM    BUN 13 06/15/2018 08:51 AM    Creatinine 0.88 06/15/2018 08:51 AM    BUN/Creatinine ratio 15 06/15/2018 08:51 AM    GFR est AA >60 06/15/2018 08:51 AM    GFR est non-AA >60 06/15/2018 08:51 AM    Calcium 8.9 06/15/2018 08:51 AM      Wt Readings from Last 3 Encounters:   07/18/18 232 lb (105.2 kg)   06/16/18 231 lb 7.7 oz (105 kg)   06/04/18 233 lb 6.4 oz (105.9 kg)      BP Readings from Last 3 Encounters:   07/18/18 (!) 180/100   06/16/18 146/86   06/04/18 140/70      Current Outpatient Prescriptions   Medication Sig    isosorbide mononitrate ER (IMDUR) 30 mg tablet Take 1 Tab by mouth daily.  prasugrel (EFFIENT) 10 mg tablet Take 1 Tab by mouth daily.  nitroglycerin (NITRODUR) 0.2 mg/hr 1 Patch by TransDERmal route daily. Indications: Chronic Stable Angina Pectoris    enalapril (VASOTEC) 10 mg tablet TAKE 1 TABLET TWICE A DAY    nitroglycerin (NITROSTAT) 0.4 mg SL tablet 1 Tab by SubLINGual route every five (5) minutes as needed for Chest Pain.  atenolol (TENORMIN) 100 mg tablet Take 1 Tab by mouth every evening.  metFORMIN ER (GLUCOPHAGE XR) 500 mg tablet TAKE 1 TABLET BEFORE BREAKFAST & DINNER    atorvastatin (LIPITOR) 20 mg tablet TAKE 1 TABLET NIGHTLY.  aspirin delayed-release 81 mg tablet Take  by mouth daily.  glucose blood VI test strips (BLOOD GLUCOSE TEST) strip Check blood sugar once daily    Blood-Glucose Meter monitoring kit Please dispense one Contour meter -diagnosis code 250.00- Check BS once daily.  Lancets misc Check BS once daily     No current facility-administered medications for this visit. Impression see above.       Written by Tg Valdez, as dictated by Chiara Munoz MD.

## 2018-08-15 NOTE — PATIENT INSTRUCTIONS
Please schedule an appointment for 3 months at [facility] and cancel all other appointments that have been scheduled. Start taking Norvasc 5 Mg daily.

## 2018-08-15 NOTE — MR AVS SNAPSHOT
[address] 1007 [facility] 
[phone] Patient: Wili Peck MRN: [id number] GDT:6/10/1880 Visit Information Date & Time Provider Department Dept. Phone Encounter #  
 8/15/2018 11:20 AM Seamus Tinsley MD [facility] [place] [phone] [id number] Your Appointments 8/29/2018 10:00 AM  
ESTABLISHED PATIENT with Seamus Tinsley MD  
[facility] (ELIE SCHEDULING) Appt Note: fu per daughter confirmed location [address] [facility] 600 1007 [facility]  
[phone]  
  
   
 [address] [facility] [address] [facility] [address]  
  
    
 9/12/2018 11:00 AM  
ESTABLISHED PATIENT with Seamus Tinsley MD  
[facility] ([address]) Appt Note: f/u  
 [address]   
[phone]  
  
   
 [address]   
  
    
 10/4/2018 10:00 AM  
ESTABLISHED PATIENT with Seamus Tinsley MD  
[facility] (ELIE SCHEDULING) Appt Note: 4 month f/u  
 [address] [place] [address]  
[address] [facility] 7 [address] Upcoming Health Maintenance Date Due DTaP/Tdap/Td series (1 - Tdap) 4/20/1975 EYE EXAM RETINAL OR DILATED Q1 12/5/2015 HEMOGLOBIN A1C Q6M 1/11/2018 MICROALBUMIN Q1 1/24/2018 LIPID PANEL Q1 1/24/2018 MEDICARE YEARLY EXAM 3/14/2018 FOOT EXAM Q1 7/19/2018 Influenza Age 5 to Adult 8/1/2018 COLONOSCOPY 12/5/2024 Allergies as of 8/15/2018  Review Complete On: 8/15/2018 By: Shelby Ybarra Severity Noted Reaction Type Reactions Percocet [Oxycodone-acetaminophen]  11/11/2014    Swelling Current Immunizations  Never Reviewed No immunizations on file. Not reviewed this visit You Were Diagnosed With   
  
 Codes Comments Hypercholesteremia    -  Primary ICD-10-CM: E78.00 ICD-9-CM: 272.0 Essential hypertension     ICD-10-CM: I10 
ICD-9-CM: 401.9 Vitals BP Pulse Resp Height(growth percentile) Weight(growth percentile) SpO2  
 142/82 (BP 1 Location: Left arm) 64 18 5' 9\" (1.753 m) 231 lb 4.8 oz (104.9 kg) 94% BMI Smoking Status 34.16 kg/m2 Former Smoker Vitals History BMI and BSA Data Body Mass Index Body Surface Area  
 34.16 kg/m 2 2.26 m 2 Preferred Pharmacy Pharmacy Name Phone [address], [address] [facility]. [phone] Your Updated Medication List  
  
   
This list is accurate as of 8/15/18 12:19 PM.  Always use your most recent med list. amLODIPine 5 mg tablet Commonly known as:  Voncile Keota Take 1 Tab by mouth daily. aspirin delayed-release 81 mg tablet Take  by mouth daily. atenolol 100 mg tablet Commonly known as:  TENORMIN Take 1 Tab by mouth every evening. atorvastatin 20 mg tablet Commonly known as:  LIPITOR  
TAKE 1 TABLET NIGHTLY. Blood-Glucose Meter monitoring kit Please dispense one Contour meter -diagnosis code 250.00- Check BS once daily. enalapril 10 mg tablet Commonly known as:  VASOTEC  
TAKE 1 TABLET TWICE A DAY  
  
 glucose blood VI test strips strip Commonly known as:  blood glucose test  
Check blood sugar once daily  
  
 isosorbide mononitrate ER 30 mg tablet Commonly known as:  IMDUR Take 1 Tab by mouth daily. Lancets Misc Check BS once daily  
  
 metFORMIN  mg tablet Commonly known as:  GLUCOPHAGE XR  
TAKE 1 TABLET BEFORE BREAKFAST & DINNER  
  
 * nitroglycerin 0.2 mg/hr Commonly known as:  NITRODUR  
1 Patch by TransDERmal route daily. Indications: Chronic Stable Angina Pectoris * nitroglycerin 0.4 mg SL tablet Commonly known as:  NITROSTAT  
1 Tab by SubLINGual route every five (5) minutes as needed for Chest Pain. prasugrel 10 mg tablet Commonly known as:  EFFIENT Take 1 Tab by mouth daily. * Notice: This list has 2 medication(s) that are the same as other medications prescribed for you. Read the directions carefully, and ask your doctor or other care provider to review them with you. Prescriptions Sent to Pharmacy Refills  
 amLODIPine (NORVASC) 5 mg tablet 5 Sig: Take 1 Tab by mouth daily. Class: Normal  
 Pharmacy: [facility].  #: [phone] Route: Oral  
  
Patient Instructions Please schedule an appointment for 3 months at [facility] office and cancel all other appointments that have been scheduled. Start taking Norvasc 5 Mg daily. Introducing [facility] SERVICES! Dear Rosanne Forrest: Thank you for requesting a Zoomy account. Our records indicate that you already have an active Zoomy account. You can access your account anytime at [email or web address] Did you know that you can access your hospital and ER discharge instructions at any time in [employer]omy? You can also review all of your test results from your hospital stay or ER visit. Additional Information If you have questions, please visit the Frequently Asked Questions section of the [employer] website at [email or web address]/. Remember, [employer]omy is NOT to be used for urgent needs. For medical emergencies, dial 911. Now available from your iPhone and Android! Please provide this summary of care documentation to your next provider. Your primary care clinician is listed as Poonam Left. If you have any questions after today's visit, please call [phone].

## 2018-08-17 RX ORDER — PRASUGREL 10 MG/1
10 TABLET, FILM COATED ORAL DAILY
Qty: 30 TAB | Refills: 5 | Status: SHIPPED | OUTPATIENT
Start: 2018-08-17 | End: 2019-03-23 | Stop reason: SDUPTHER

## 2018-08-17 NOTE — TELEPHONE ENCOUNTER
From: Adriane Grissom Marker  To: Aj Hernandez MD  Sent: 8/17/2018 9:21 AM EDT  Subject: Medication Renewal Request    Original authorizing provider: Aj Hernandez MD    [address] [address] would like a refill of the following medications:  prasugrel (EFFIENT) 10 mg tablet Aj Hernandez MD]    Preferred pharmacy: [address] [place] [address].     Comment:

## 2018-08-17 NOTE — TELEPHONE ENCOUNTER
Requested Prescriptions     Signed Prescriptions Disp Refills    prasugrel (EFFIENT) 10 mg tablet 30 Tab 5     Sig: Take 1 Tab by mouth daily.      Authorizing Provider: Veronica Kinney     Ordering User: Ken Lam  Date Time Provider [facility] [place] [place]   11/14/2018 1:40 PM Melanie Ahn MD [place] [address]

## 2018-08-20 DIAGNOSIS — I25.709 CORONARY ARTERY DISEASE INVOLVING CORONARY BYPASS GRAFT OF NATIVE HEART WITH ANGINA PECTORIS (HCC): ICD-10-CM

## 2018-08-20 DIAGNOSIS — I10 ESSENTIAL HYPERTENSION: ICD-10-CM

## 2018-08-21 RX ORDER — ATENOLOL 100 MG/1
100 TABLET ORAL EVERY EVENING
Qty: 90 TAB | Refills: 1 | Status: SHIPPED | OUTPATIENT
Start: 2018-08-21 | End: 2019-02-19 | Stop reason: SDUPTHER

## 2018-08-21 NOTE — TELEPHONE ENCOUNTER
Refill of atenolol 100mg completed per VO of Dr. Mauricio Briones.    Last OV: 8/2018  Next OV: 11/2018

## 2018-08-21 NOTE — TELEPHONE ENCOUNTER
From: Kiarra Felix Marker  To: Fatuma Martinez MD  Sent: 8/20/2018 5:13 PM EDT  Subject: Medication Renewal Request    Original authorizing provider: Fatuma Martinez MD    [address] [place] [address] would like a refill of the following medications:  atenolol (TENORMIN) 100 mg tablet Fatuma Martinez MD]    Preferred pharmacy: [address] [place] [address].     Comment:

## 2018-11-14 ENCOUNTER — OFFICE VISIT (OUTPATIENT)
Dept: CARDIOLOGY CLINIC | Age: 63
End: 2018-11-14

## 2018-11-14 VITALS
OXYGEN SATURATION: 94 % | BODY MASS INDEX: 34.21 KG/M2 | HEIGHT: 69 IN | TEMPERATURE: 97.4 F | DIASTOLIC BLOOD PRESSURE: 82 MMHG | RESPIRATION RATE: 20 BRPM | SYSTOLIC BLOOD PRESSURE: 154 MMHG | WEIGHT: 231 LBS | HEART RATE: 58 BPM

## 2018-11-14 DIAGNOSIS — Z95.1 S/P CABG X 3: ICD-10-CM

## 2018-11-14 DIAGNOSIS — E78.00 HYPERCHOLESTEREMIA: ICD-10-CM

## 2018-11-14 DIAGNOSIS — I10 ESSENTIAL HYPERTENSION: ICD-10-CM

## 2018-11-14 DIAGNOSIS — Z95.820 STATUS POST ANGIOPLASTY WITH STENT: ICD-10-CM

## 2018-11-14 DIAGNOSIS — I25.708 CORONARY ARTERY DISEASE OF BYPASS GRAFT OF NATIVE HEART WITH STABLE ANGINA PECTORIS (HCC): Primary | ICD-10-CM

## 2018-11-14 NOTE — PATIENT INSTRUCTIONS
Tala Ashley with [facility]  [address] [place] [address], [facility] [address] [facility], [address] [facility]  [phone]    Monitor blood pressure outside the office several times weekly at different times during the day and evening. Bring the record to me in 3 weeks for review. Blood Pressure Record     Patient Name:  ______________________ :  ______________________    Date/Time BP Reading Pulse                                                                                                                                                                                                Home Blood Pressure Test: About This Test  What is it? A home blood pressure test allows you to keep track of your blood pressure at home. Blood pressure is a measure of the force of blood against the walls of your arteries. Blood pressure readings include two numbers, such as 130/80 (say \"130 over 80\"). The first number is the systolic pressure. The second number is the diastolic pressure. Why is this test done? You may do this test at home to:  · Find out if you have high blood pressure. · Track your blood pressure if you have high blood pressure. · Track how well medicine is working to reduce high blood pressure. · Check how lifestyle changes, such as weight loss and exercise, are affecting blood pressure. How can you prepare for the test?  · Do not use caffeine, tobacco, or medicines known to raise blood pressure (such as nasal decongestant sprays) for at least 30 minutes before taking your blood pressure. · Do not exercise for at least 30 minutes before taking your blood pressure. What happens before the test?  Take your blood pressure while you feel comfortable and relaxed.  Sit quietly with both feet on the floor for at least 5 minutes before the test.  What happens during the test?  · Sit with your arm slightly bent and resting on a table so that your upper arm is at the same level as your heart.  · Roll up your sleeve or take off your shirt to expose your upper arm. · Wrap the blood pressure cuff around your upper arm so that the lower edge of the cuff is about 1 inch above the bend of your elbow. Proceed with the following steps depending on if you are using an automatic or manual pressure monitor. Automatic blood pressure monitors  · Press the on/off button on the automatic monitor and wait until the ready-to-measure \"heart\" symbol appears next to zero in the display window. · Press the start button. The cuff will inflate and deflate by itself. · Your blood pressure numbers will appear on the screen. · Write your numbers in your log book, along with the date and time. Manual blood pressure monitors  · Place the earpieces of a stethoscope in your ears, and place the bell of the stethoscope over the artery, just below the cuff. · Close the valve on the rubber inflating bulb. · Squeeze the bulb rapidly with your opposite hand to inflate the cuff until the dial or column of mercury reads about 30 mm Hg higher than your usual systolic pressure. If you do not know your usual pressure, inflate the cuff to 210 mm Hg or until the pulse at your wrist disappears. · Open the pressure valve just slightly by twisting or pressing the valve on the bulb. · As you watch the pressure slowly fall, note the level on the dial at which you first start to hear a pulsing or tapping sound through the stethoscope. This is your systolic blood pressure. · Continue letting the air out slowly. The sounds will become muffled and will finally disappear. Note the pressure when the sounds completely disappear. This is your diastolic blood pressure. Let out all the remaining air. · Write your numbers in your log book, along with the date and time. What else should you know about the test?  Here are the categories of blood pressure for adults:  Ideal blood pressure.    Systolic is less than 053, and diastolic is less than 80.  Elevated blood pressure. Systolic is 005 to 705, and diastolic is less than 80. High blood pressure (hypertension). Systolic is 636 or above. Diastolic is 80 or above. One or both numbers may be high. It is more accurate to take the average of several readings made throughout the day than to rely on a single reading. Follow-up care is a key part of your treatment and safety. Be sure to make and go to all appointments, and call your doctor if you are having problems. It's also a good idea to keep a list of the medicines you take. Where can you learn more? Go to http://ben-shavon.info/. Enter C427 in the search box to learn more about \"Home Blood Pressure Test: About This Test.\"  Current as of: December 6, 2017  Content Version: 11.8  © 9780-5984 Healthwise, Incorporated. Care instructions adapted under license by [employer] (which disclaims liability or warranty for this information). If you have questions about a medical condition or this instruction, always ask your healthcare professional. [place] [address] any warranty or liability for your use of this information.

## 2018-11-14 NOTE — PROGRESS NOTES
1. Have you been to the ER, urgent care clinic since your last visit? Hospitalized since your last visit? No    2. Have you seen or consulted any other health care providers outside of the [address] [place] [address] since your last visit? Include any pap smears or colon screening.  No  Reviewed record in preparation for visit and have necessary documentation  Pt did not bring medication to office visit for review    Goals that were addressed and/or need to be completed during or after this appointment include   Health Maintenance Due   Topic Date Due    DTaP/Tdap/Td series (1 - Tdap) 04/20/1975    Shingrix Vaccine Age 50> (1 of 2) 04/20/2004    EYE EXAM RETINAL OR DILATED Q1  12/05/2015    HEMOGLOBIN A1C Q6M  01/11/2018    MICROALBUMIN Q1  01/24/2018    LIPID PANEL Q1  01/24/2018    MEDICARE YEARLY EXAM  03/14/2018    FOOT EXAM Q1  07/19/2018    Influenza Age 9 to Adult  08/01/2018

## 2018-11-14 NOTE — PROGRESS NOTES
JOSE LARSON MARKER      1955   Frankie Mclaughlin MD  Date of Visit-11/14/2018     [facility],  PCP=Nathen Schultz MD     [facility]. HPI:   Cristian Villa is a 58 y.o. male   prior CABG 2014 had PCI of mid RCA 90% lesion 6/15/18. Last visit stable CAD and added Norvasc for HTN  He returns , melancholy, says he has not checked bp much and not sure where it stands, not interested in changing meds  \"i dont want to fool with it:  Gets some right sided pressure when working hard with hands over head, not very specific about frequency or timing  Says its short enough he does not have to use NTG SL, always goes away when stops activity  Assessment/Plans:  1. Coronary artery disease of bypass graft of native heart with stable angina pectoris (HCC)-stable pattern of angina, meds below included nitrates, BB, statin and DAPT  Key CAD CHF Meds             atenolol (TENORMIN) 100 mg tablet  (Taking) Take 1 Tab by mouth every evening. amLODIPine (NORVASC) 5 mg tablet  (Taking) Take 1 Tab by mouth daily. isosorbide mononitrate ER (IMDUR) 30 mg tablet  (Taking) Take 1 Tab by mouth daily. enalapril (VASOTEC) 10 mg tablet  (Taking) TAKE 1 TABLET TWICE A DAY    atorvastatin (LIPITOR) 20 mg tablet  (Taking) TAKE 1 TABLET NIGHTLY. aspirin delayed-release 81 mg tablet  (Taking) Take  by mouth daily. prasugrel (EFFIENT) 10 mg tablet Take 1 Tab by mouth daily. nitroglycerin (NITRODUR) 0.2 mg/hr 1 Patch by TransDERmal route daily. Indications: Chronic Stable Angina Pectoris    nitroglycerin (NITROSTAT) 0.4 mg SL tablet 1 Tab by SubLINGual route every five (5) minutes as needed for Chest Pain. 2. Essential hypertension  subopitmal but pt not interested in changing meds    3. Hypercholesteremia  Lipids on high potency statin as appropriate for secondary prevention. 4. S/P CABG x 3-2014    5.  Status post angioplasty with stent June 2018    Future Appointments   Date Time Provider Stephanie Javier   5/8/2019  2:00 PM Susana Tracy MD [address]       Cardiac History:   CABG 11-12-14= LIMA to LAD, SVG to OM1,OM2    Cath 11/11/14 -pre CABG  RCA with patent stent  mLCX 80, OM1 mid 80   LAD- 85 tubular at ostium, mid [de-identified] EF60%    [place] PCI June 2018 to mid RCA 90% open grafts to OMs and LIMA, still a waist at the RCA prox stent edge     ROS:Cardiac complete  as above. Respiratory as above with no wheezing or hemoptysis. He denies  symptoms of unusual weight loss , fevers,  BRBPR, hematuria,  or recent stroke    Past Medical History:   Diagnosis Date    CAD (coronary artery disease)     stent to RCA [place];CABG 11-12-14    DM2 (diabetes mellitus, type 2) ([facility])     Hypercholesteremia     Hypertension       Exam and Labs:  Visit Vitals  /82 (BP 1 Location: Left arm, BP Patient Position: Sitting)   Pulse (!) 58   Temp 97.4 °F (36.3 °C) (Oral)   Resp 20   Ht 5' 9\" (1.753 m)   Wt 231 lb (104.8 kg)   SpO2 94%   BMI 34.11 kg/m²     Constitutional:  NAD, comfortable , moist mucous membranesHENT: Head: NC,ATEyes: No scleral icterus. Neck:  Neck supple. No JVD present. No tracheal deviation,mass  Chest: Effort normal & normal respiratory excursion     Lungs:breath sounds normal. No stridor. distress, wheezes or  Rales. Heart:normal rate, regular rhythm, normal S1, S2, no murmurs, rubs, clicks or gallops , PMI non displaced. Edema: Edema is none. Extremities:  no clubbing or cyanosis. Abdominal:  no abnormal distension. Neurological: alert, conversant and oriented . Skin: Skin is not cold. No obvious systemic rash noted. Not diaphoretic. No erythema. Psychiatric:  Grossly normal mood and affect.   Behavior appears normal.     Lab Results   Component Value Date/Time    Cholesterol, total 163 01/24/2017 09:00 AM    HDL Cholesterol 39 (L) 01/24/2017 09:00 AM    LDL, calculated 95 01/24/2017 09:00 AM    Triglyceride 146 01/24/2017 09:00 AM     Lab Results   Component Value Date/Time    Sodium 140 06/15/2018 08:51 AM    Potassium 4.3 06/15/2018 08:51 AM    Chloride 106 06/15/2018 08:51 AM    CO2 26 06/15/2018 08:51 AM    Anion gap 8 06/15/2018 08:51 AM    Glucose 238 (H) 06/15/2018 08:51 AM    BUN 13 06/15/2018 08:51 AM    Creatinine 0.88 06/15/2018 08:51 AM    BUN/Creatinine ratio 15 06/15/2018 08:51 AM    GFR est AA >60 06/15/2018 08:51 AM    GFR est non-AA >60 06/15/2018 08:51 AM    Calcium 8.9 06/15/2018 08:51 AM      Wt Readings from Last 3 Encounters:   11/14/18 231 lb (104.8 kg)   08/15/18 231 lb 4.8 oz (104.9 kg)   07/18/18 232 lb (105.2 kg)      BP Readings from Last 3 Encounters:   11/14/18 154/82   08/15/18 142/82   07/18/18 (!) 180/100        Current Outpatient Medications   Medication Sig    atenolol (TENORMIN) 100 mg tablet Take 1 Tab by mouth every evening.  amLODIPine (NORVASC) 5 mg tablet Take 1 Tab by mouth daily.  isosorbide mononitrate ER (IMDUR) 30 mg tablet Take 1 Tab by mouth daily.  enalapril (VASOTEC) 10 mg tablet TAKE 1 TABLET TWICE A DAY    metFORMIN ER (GLUCOPHAGE XR) 500 mg tablet TAKE 1 TABLET BEFORE BREAKFAST & DINNER    atorvastatin (LIPITOR) 20 mg tablet TAKE 1 TABLET NIGHTLY.  aspirin delayed-release 81 mg tablet Take  by mouth daily.  glucose blood VI test strips (BLOOD GLUCOSE TEST) strip Check blood sugar once daily    Blood-Glucose Meter monitoring kit Please dispense one Contour meter -diagnosis code 250.00- Check BS once daily.  Lancets misc Check BS once daily    prasugrel (EFFIENT) 10 mg tablet Take 1 Tab by mouth daily.  nitroglycerin (NITRODUR) 0.2 mg/hr 1 Patch by TransDERmal route daily. Indications: Chronic Stable Angina Pectoris    nitroglycerin (NITROSTAT) 0.4 mg SL tablet 1 Tab by SubLINGual route every five (5) minutes as needed for Chest Pain. No current facility-administered medications for this visit.        Past Surgical History:   Procedure Laterality Date    CARDIAC SURG PROCEDURE UNLIST      HX ORTHOPAEDIC        Social Hx=  reports that he has quit smoking. he has never used smokeless tobacco. He reports that he does not drink alcohol or use drugs. Family Hx= family history includes Diabetes in his mother; Heart Disease in his father; Stroke in his father. Impression see above.

## 2018-12-13 DIAGNOSIS — I25.709 CORONARY ARTERY DISEASE INVOLVING CORONARY BYPASS GRAFT OF NATIVE HEART WITH ANGINA PECTORIS (HCC): ICD-10-CM

## 2018-12-13 DIAGNOSIS — I10 ESSENTIAL HYPERTENSION: ICD-10-CM

## 2018-12-14 RX ORDER — ENALAPRIL MALEATE 10 MG/1
TABLET ORAL
Qty: 180 TAB | Refills: 3 | Status: SHIPPED | OUTPATIENT
Start: 2018-12-14

## 2018-12-14 NOTE — TELEPHONE ENCOUNTER
Request for enalapril 10mg BID. Last office visit 11-14-18, next office visit 5-8-19.  Refills per verbal order from Dr. Danielito Le.

## 2019-02-19 DIAGNOSIS — I10 ESSENTIAL HYPERTENSION: ICD-10-CM

## 2019-02-19 DIAGNOSIS — I25.709 CORONARY ARTERY DISEASE INVOLVING CORONARY BYPASS GRAFT OF NATIVE HEART WITH ANGINA PECTORIS (HCC): ICD-10-CM

## 2019-02-19 RX ORDER — ATENOLOL 100 MG/1
100 TABLET ORAL EVERY EVENING
Qty: 90 TAB | Refills: 3 | Status: SHIPPED | OUTPATIENT
Start: 2019-02-19

## 2019-02-19 NOTE — TELEPHONE ENCOUNTER
Request for atenolol 100mg QHS. Last office visit 11-14-18, next office visit 5-8-19.  Refills per verbal order from Dr. Marcial King.

## 2019-03-04 DIAGNOSIS — E11.9 TYPE 2 DIABETES MELLITUS WITHOUT COMPLICATION, WITHOUT LONG-TERM CURRENT USE OF INSULIN (HCC): ICD-10-CM

## 2019-03-04 RX ORDER — METFORMIN HYDROCHLORIDE 500 MG/1
TABLET, EXTENDED RELEASE ORAL
Qty: 60 TAB | Refills: 0 | Status: CANCELLED | OUTPATIENT
Start: 2019-03-04

## 2019-03-23 DIAGNOSIS — Z95.820 STATUS POST ANGIOPLASTY WITH STENT: Primary | ICD-10-CM

## 2019-03-23 DIAGNOSIS — I25.708 CORONARY ARTERY DISEASE OF BYPASS GRAFT OF NATIVE HEART WITH STABLE ANGINA PECTORIS (HCC): ICD-10-CM

## 2019-03-25 RX ORDER — PRASUGREL 10 MG/1
10 TABLET, FILM COATED ORAL DAILY
Qty: 90 TAB | Refills: 3 | Status: SHIPPED | OUTPATIENT
Start: 2019-03-25

## 2019-03-25 NOTE — TELEPHONE ENCOUNTER
Request for prasugrel 10mg daily. Last office visit 11-14-18, next office visit 5-8-19.  Refills per verbal order from Dr. Edith Green.

## 2019-05-08 ENCOUNTER — TELEPHONE (OUTPATIENT)
Dept: CARDIOLOGY CLINIC | Age: 64
End: 2019-05-08

## 2019-05-08 ENCOUNTER — OFFICE VISIT (OUTPATIENT)
Dept: CARDIOLOGY CLINIC | Age: 64
End: 2019-05-08

## 2019-05-08 VITALS
HEIGHT: 69 IN | HEART RATE: 66 BPM | OXYGEN SATURATION: 96 % | RESPIRATION RATE: 20 BRPM | TEMPERATURE: 98 F | BODY MASS INDEX: 34.36 KG/M2 | SYSTOLIC BLOOD PRESSURE: 134 MMHG | WEIGHT: 232 LBS | DIASTOLIC BLOOD PRESSURE: 74 MMHG

## 2019-05-08 DIAGNOSIS — Z01.818 PREOP TESTING: ICD-10-CM

## 2019-05-08 DIAGNOSIS — I10 ESSENTIAL HYPERTENSION: ICD-10-CM

## 2019-05-08 DIAGNOSIS — I25.708 CORONARY ARTERY DISEASE OF BYPASS GRAFT OF NATIVE HEART WITH STABLE ANGINA PECTORIS (HCC): Primary | ICD-10-CM

## 2019-05-08 DIAGNOSIS — I25.709 CORONARY ARTERY DISEASE INVOLVING CORONARY BYPASS GRAFT OF NATIVE HEART WITH ANGINA PECTORIS (HCC): ICD-10-CM

## 2019-05-08 DIAGNOSIS — Z95.1 S/P CABG X 3: ICD-10-CM

## 2019-05-08 DIAGNOSIS — I25.110 CORONARY ARTERY DISEASE INVOLVING NATIVE CORONARY ARTERY OF NATIVE HEART WITH UNSTABLE ANGINA PECTORIS (HCC): Primary | ICD-10-CM

## 2019-05-08 DIAGNOSIS — E78.00 HYPERCHOLESTEREMIA: ICD-10-CM

## 2019-05-08 DIAGNOSIS — Z95.820 STATUS POST ANGIOPLASTY WITH STENT: ICD-10-CM

## 2019-05-08 NOTE — TELEPHONE ENCOUNTER
----- Message from Karl De Jesus MD sent at 5/8/2019  2:05 PM EDT -----  Get lexiscan nuclear and echo asap  preop lung mass  Cad with Nano

## 2019-05-08 NOTE — Clinical Note
6/9/19 Patient: Roopa Lopez YOB: 1955 Date of Visit: 5/8/2019 Karen Cruz, [address] [address] VIA Facsimile: [phone] Dear Karen Cruz MD, Thank you for referring Mr. Ramsey Hernandez to [facility] for evaluation. My notes for this consultation are attached. If you have questions, please do not hesitate to call me. I look forward to following your patient along with you.  
 
 
Sincerely, 
 
Ken Grace MD

## 2019-05-08 NOTE — PROGRESS NOTES
1. Have you been to the ER, urgent care clinic since your last visit? Hospitalized since your last visit? No    2. Have you seen or consulted any other health care providers outside of the [address] [place] [address] since your last visit? Include any pap smears or colon screening.  No  Reviewed record in preparation for visit and have necessary documentation  Pt did not bring medication to office visit for review    Goals that were addressed and/or need to be completed during or after this appointment include   Health Maintenance Due   Topic Date Due    DTaP/Tdap/Td series (1 - Tdap) 04/20/1975    Shingrix Vaccine Age 50> (1 of 2) 04/20/2004    EYE EXAM RETINAL OR DILATED  12/05/2016    MEDICARE YEARLY EXAM  03/14/2018    FOOT EXAM Q1  07/19/2018    GLAUCOMA SCREENING Q2Y  04/20/2019    Pneumococcal 65+ years (1 of 2 - PCV13) 04/20/2019    HEMOGLOBIN A1C Q6M  06/08/2019

## 2019-05-13 NOTE — TELEPHONE ENCOUNTER
Future Appointments   Date Time Provider Stephanie Javier   5/28/2019  8:00 AM VASCULAR, [address]   5/28/2019 10:00 AM NUCLEAR, [address]

## 2019-06-10 NOTE — PROGRESS NOTES
JOSE A MARKER      1955   Ken Grace MD  Date of Visit-6/9/2019     [facility],  PCP=Juanita Moore DO     [facility] [address] [place] [address] [facility] [place]. HPI:   Roopa Lopez is a 71 y.o. male   10 m fu of CAD here with family   He has lung mass but not sure he wants anything looked at   prior CABG 2014 had PCI of mid RCA 90% lesion 6/15/18. Recent  stable CAD and added Norvasc for HTN but does not check BP and non compliant with bp diary  Denies edema, syncope or shortness of breath at rest   Has no tachycardia , palpitations or sense of arrythmia    But now admits to occ CP lasting few minutes if he overexerts-thinks it may be worse, but vague to us on details  Mild worse ARMENTA  Assessment/Plans:  1. Coronary artery disease of bypass graft of native heart with un stable angina pectoris (HCC)-unstable pattern of angina, meds below included nitrates, BB, statin and DAPT  Key CAD CHF Meds             prasugrel (EFFIENT) 10 mg tablet (Taking) Take 1 Tab by mouth daily. atenolol (TENORMIN) 100 mg tablet (Taking) Take 1 Tab by mouth every evening. enalapril (VASOTEC) 10 mg tablet (Taking) TAKE 1 TABLET TWICE DAILY. nitroglycerin (NITRODUR) 0.2 mg/hr (Taking) 1 Patch by TransDERmal route daily. Indications: Chronic Stable Angina Pectoris    nitroglycerin (NITROSTAT) 0.4 mg SL tablet (Taking) 1 Tab by SubLINGual route every five (5) minutes as needed for Chest Pain. atorvastatin (LIPITOR) 20 mg tablet (Taking) TAKE 1 TABLET NIGHTLY. aspirin delayed-release 81 mg tablet (Taking) Take  by mouth daily. amLODIPine (NORVASC) 5 mg tablet Take 1 Tab by mouth daily. isosorbide mononitrate ER (IMDUR) 30 mg tablet Take 1 Tab by mouth daily.           2. Essential hypertension  BP Readings from Last 6 Encounters:   05/08/19 134/74   11/14/18 154/82   08/15/18 142/82   07/18/18 (!) 180/100   06/16/18 146/86   06/04/18 140/70       subopitmal but pt not interested in changing meds    3. Hypercholesteremia  Lipids on high potency statin as appropriate for secondary prevention. Lab Results   Component Value Date/Time    LDL, calculated 95 01/24/2017 09:00 AM      4. S/P CABG x 3-2014    5. Status post angioplasty with stent June 2018    Future Appointments   Date Time Provider Stephanie Javier   6/13/2019 12:00 PM NUCLEAR, [address]   6/13/2019  1:00 PM VASCULAR, [address]    6. Preop   After extensive discussion he agrees to proceed with testing  Will need echo and stress nuclear prior to lung mass surgery given his tenous CAD and active angina, poor compliance with BP fu readings     Cardiac History:   CABG 11-12-14= LIMA to LAD, SVG to OM1,OM2    Cath 11/11/14 -pre CABG  RCA with patent stent  mLCX 80, OM1 mid 80   LAD- 85 tubular at ostium, mid 80 EF60%    [place] PCI June 2018 to mid RCA 90% open grafts to OMs and LIMA, still a waist at the RCA prox stent edge     ROS:Cardiac complete  as above. Respiratory as above with no wheezing or hemoptysis. He denies  symptoms of unusual weight loss , fevers,  BRBPR, hematuria,  or recent stroke    Past Medical History:   Diagnosis Date    CAD (coronary artery disease)     stent to RCA [place];CABG 11-12-14    DM2 (diabetes mellitus, type 2) ([facility])     Hypercholesteremia     Hypertension       Exam and Labs:  Visit Vitals  /74 (BP 1 Location: Left arm)   Pulse 66   Temp 98 °F (36.7 °C) (Oral)   Resp 20   Ht 5' 9\" (1.753 m)   Wt 232 lb (105.2 kg)   SpO2 96%   BMI 34.26 kg/m²     Constitutional:  NAD, comfortable , moist mucous membranesHENT: Head: NC,ATEyes: No scleral icterus. Neck:  Neck supple. No JVD present. No tracheal deviation,mass  Chest: Effort normal & normal respiratory excursion     Lungs:breath sounds normal. No stridor. distress, wheezes or  Rales.   Heart:normal rate, regular rhythm, normal S1, S2, no murmurs, rubs, clicks or gallops , PMI non displaced. Edema: Edema is none. Extremities:  no clubbing or cyanosis. Abdominal:  no abnormal distension. Neurological: alert, conversant and oriented . Skin: Skin is not cold. No obvious systemic rash noted. Not diaphoretic. No erythema. Psychiatric:  Grossly normal mood and affect. Behavior appears normal.     Lab Results   Component Value Date/Time    Cholesterol, total 163 01/24/2017 09:00 AM    HDL Cholesterol 39 (L) 01/24/2017 09:00 AM    LDL, calculated 95 01/24/2017 09:00 AM    Triglyceride 146 01/24/2017 09:00 AM     Lab Results   Component Value Date/Time    Sodium 140 06/15/2018 08:51 AM    Potassium 4.3 06/15/2018 08:51 AM    Chloride 106 06/15/2018 08:51 AM    CO2 26 06/15/2018 08:51 AM    Anion gap 8 06/15/2018 08:51 AM    Glucose 238 (H) 06/15/2018 08:51 AM    BUN 13 06/15/2018 08:51 AM    Creatinine 0.88 06/15/2018 08:51 AM    BUN/Creatinine ratio 15 06/15/2018 08:51 AM    GFR est AA >60 06/15/2018 08:51 AM    GFR est non-AA >60 06/15/2018 08:51 AM    Calcium 8.9 06/15/2018 08:51 AM      Wt Readings from Last 3 Encounters:   05/08/19 232 lb (105.2 kg)   11/14/18 231 lb (104.8 kg)   08/15/18 231 lb 4.8 oz (104.9 kg)      BP Readings from Last 3 Encounters:   05/08/19 134/74   11/14/18 154/82   08/15/18 142/82        Current Outpatient Medications   Medication Sig    prasugrel (EFFIENT) 10 mg tablet Take 1 Tab by mouth daily.  atenolol (TENORMIN) 100 mg tablet Take 1 Tab by mouth every evening.  enalapril (VASOTEC) 10 mg tablet TAKE 1 TABLET TWICE DAILY.  nitroglycerin (NITRODUR) 0.2 mg/hr 1 Patch by TransDERmal route daily. Indications: Chronic Stable Angina Pectoris    nitroglycerin (NITROSTAT) 0.4 mg SL tablet 1 Tab by SubLINGual route every five (5) minutes as needed for Chest Pain.  metFORMIN ER (GLUCOPHAGE XR) 500 mg tablet TAKE 1 TABLET BEFORE BREAKFAST & DINNER    atorvastatin (LIPITOR) 20 mg tablet TAKE 1 TABLET NIGHTLY.     aspirin delayed-release 81 mg tablet Take by mouth daily.  glucose blood VI test strips (BLOOD GLUCOSE TEST) strip Check blood sugar once daily    Blood-Glucose Meter monitoring kit Please dispense one Contour meter -diagnosis code 250.00- Check BS once daily.  Lancets misc Check BS once daily    amLODIPine (NORVASC) 5 mg tablet Take 1 Tab by mouth daily.  isosorbide mononitrate ER (IMDUR) 30 mg tablet Take 1 Tab by mouth daily. No current facility-administered medications for this visit. Past Surgical History:   Procedure Laterality Date    CARDIAC SURG PROCEDURE UNLIST      HX ORTHOPAEDIC        Social Hx=  reports that he has quit smoking. He has never used smokeless tobacco. He reports that he does not drink alcohol or use drugs. Family Hx= family history includes Diabetes in his mother; Heart Disease in his father; Stroke in his father. Impression see above.

## 2019-11-13 ENCOUNTER — OFFICE VISIT (OUTPATIENT)
Dept: CARDIOLOGY CLINIC | Age: 64
End: 2019-11-13

## 2019-11-13 VITALS
TEMPERATURE: 97 F | OXYGEN SATURATION: 96 % | WEIGHT: 224 LBS | RESPIRATION RATE: 20 BRPM | BODY MASS INDEX: 33.18 KG/M2 | HEART RATE: 68 BPM | DIASTOLIC BLOOD PRESSURE: 72 MMHG | SYSTOLIC BLOOD PRESSURE: 140 MMHG | HEIGHT: 69 IN

## 2019-11-13 DIAGNOSIS — I25.709 CORONARY ARTERY DISEASE INVOLVING CORONARY BYPASS GRAFT OF NATIVE HEART WITH ANGINA PECTORIS (HCC): ICD-10-CM

## 2019-11-13 DIAGNOSIS — Z95.820 STATUS POST ANGIOPLASTY WITH STENT: ICD-10-CM

## 2019-11-13 DIAGNOSIS — I10 ESSENTIAL HYPERTENSION: ICD-10-CM

## 2019-11-13 DIAGNOSIS — I25.708 CORONARY ARTERY DISEASE OF BYPASS GRAFT OF NATIVE HEART WITH STABLE ANGINA PECTORIS (HCC): Primary | ICD-10-CM

## 2019-11-13 DIAGNOSIS — R91.1 LUNG NODULE: ICD-10-CM

## 2019-11-13 DIAGNOSIS — E78.00 HYPERCHOLESTEREMIA: ICD-10-CM

## 2019-11-13 DIAGNOSIS — Z95.1 S/P CABG X 3: ICD-10-CM

## 2019-11-13 NOTE — PROGRESS NOTES
JOSE A MARKER      1955   Amelia Bauman MD  Date of Visit-11/13/2019     [facility],  PCP=Nicole Moore,      [facility] [place]. HPI:   Inocencio Reynolds is a 71 y.o. male   10 m fu of CAD   has lung nodule  Seen in May  prior CABG 2014 had PCI of mid RCA 90% lesion 6/15/18. Non compliant with  Norvasc for HTN but does not check BP and non compliant with bp diary  Gets occ CP and SOB , working clearing out a factory , supervising several other men, gets CP when gets overstressed  No edema, SOB at rest, palps    Since that visit, we did stress and note to them:  Called and spoke to patient -ID X2 and spoke seperately to [employer] his dtr (in [address] form and pt said ok to call)  Nuke with small reversible and fixed areas where RCA stent done  Pt has stable angina pattern , and since low risk findings, dont think he needs cath unless CP worsens  He can proceed to lung mass surgery and advised to fu with surgeons, he is not sure he wants to proceed  See me in November at [facility] [address] Mina Deal, via [address] I can see there was CT chest -9 with   \"Unchanged nodule in the left lower lobe is noted measuring 1.6 x 1.1 cm\"   Previously the concern was abnormal PET imaging    Assessment/Plans:  1. Coronary artery disease of bypass graft of native heart with stable angina pectoris ([address]  -stable pattern of angina, meds below included nitrates, BB, statin and DAPT  06/13/19   NUCLEAR CARDIAC STRESS TEST 06/21/2019 6/25/2019    Narrative · Gated SPECT: Left ventricular function post-stress was normal.   Calculated ejection fraction is 62%. The TID ratio is 0.98. · Left ventricular perfusion is abnormal.  · Myocardial perfusion imaging defect 1: There is a defect that is small   in size with a moderate reduction in uptake present in the apical inferior   location(s) that is reversible.  The defect appears to be infarction with   margareth-infarct ischemia. · Myocardial perfusion imaging defect 2: There is a defect that is small   to moderate in size with a moderate reduction in uptake affecting the mid   to basal inferior location(s) that is non-reversible. The defect appears   to be infarction. · Abnormal myocardial perfusion imaging. Myocardial perfusion imaging   supports an intermediate risk stress test.     Good LV function with Lexiscan nuclear EF 62%  Reversible small inferior apical defect. Fixed moderate mid-basal inferior defect. CABG 11-12-14= LIMA to LAD, SVG to [address] PCI June 2018 to mid RCA 90% open grafts to OMs and LIMA, still a   waist at the RCA prox stent edge        Signed by: Lynne Juarez MD          2. Essential hypertension  BP Readings from Last 6 Encounters:   11/13/19 140/72   06/13/19 135/70   05/08/19 134/74   11/14/18 154/82   08/15/18 142/82   07/18/18 (!) 180/100       subopitmal but pt not interested in changing meds    3. Hypercholesteremia  Lipids on high potency statin as appropriate for secondary prevention. Lab Results   Component Value Date/Time    LDL, calculated 95 01/24/2017 09:00 AM      4. S/P CABG x 3-2014    5. Status post angioplasty with stent June 2018     6. Preop   Looks like nodule same size so no surgery planned by patient  Fu one year     1. Coronary artery disease of bypass graft of native heart with stable angina pectoris ([facility] [address]    2. Coronary artery disease involving coronary bypass graft of native heart with angina pectoris ([address]    3. Essential hypertension    4. Hypercholesteremia    5. S/P CABG x 3    6. Status post angioplasty with stent    7. Lung nodule       06/13/19   ECHO ADULT COMPLETE 06/20/2019 6/20/2019    Narrative · Left Ventricle: Calculated left ventricular ejection fraction is 68%. Biplane method used to measure ejection fraction. Abnormal left   ventricular wall motion. Abnormal left ventricular strain. · Mitral Valve: Mild mitral valve regurgitation.   · Aortic Valve: Probably trileaflet aortic valve. Mild aortic valve   sclerosis with no significant stenosis. Signed by: Jake Melvin MD      06/13/19   NUCLEAR CARDIAC STRESS TEST 06/21/2019 6/25/2019    Narrative · Gated SPECT: Left ventricular function post-stress was normal.   Calculated ejection fraction is 62%. The TID ratio is 0.98. · Left ventricular perfusion is abnormal.  · Myocardial perfusion imaging defect 1: There is a defect that is small   in size with a moderate reduction in uptake present in the apical inferior   location(s) that is reversible. The defect appears to be infarction with   margareth-infarct ischemia. · Myocardial perfusion imaging defect 2: There is a defect that is small   to moderate in size with a moderate reduction in uptake affecting the mid   to basal inferior location(s) that is non-reversible. The defect appears   to be infarction. · Abnormal myocardial perfusion imaging. Myocardial perfusion imaging   supports an intermediate risk stress test.     Good LV function with Lexiscan nuclear EF 62%  Reversible small inferior apical defect. Fixed moderate mid-basal inferior defect. CABG 11-12-14= LIMA to LAD, SVG to [address] PCI June 2018 to mid RCA 90% open grafts to OMs and LIMA, still a   waist at the RCA prox stent edge        Signed by: Jake Melvin MD      Cardiac History:   CABG 11-12-14= LIMA to LAD, SVG to OM1,OM2    Cath 11/11/14 -pre CABG  RCA with patent stent  mLCX 80, OM1 mid 80   LAD- 85 tubular at ostium, mid [de-identified] EF60%    [place] PCI June 2018 to mid RCA 90% open grafts to OMs and LIMA, still a waist at the RCA prox stent edge     ROS:Cardiac complete  as above. Respiratory as above with no wheezing or hemoptysis.    He denies  symptoms of unusual weight loss , fevers,  BRBPR, hematuria,  or recent stroke    Past Medical History:   Diagnosis Date    CAD (coronary artery disease)     stent to RCA [place];CABG 11-12-14    DM2 (diabetes mellitus, type 2) ([facility]     Hypercholesteremia     Hypertension       Exam and Labs:  Visit Vitals  /72 (BP 1 Location: Right arm, BP Patient Position: Sitting)   Pulse 68   Temp 97 °F (36.1 °C) (Oral)   Resp 20   Ht 5' 9\" (1.753 m)   Wt 224 lb (101.6 kg)   SpO2 96%   BMI 33.08 kg/m²     Constitutional:  NAD, comfortable , moist mucous membranesHENT: Head: NC,ATEyes: No scleral icterus. Neck:  Neck supple. No JVD present. No tracheal deviation,mass  Chest: Effort normal & normal respiratory excursion     Lungs:breath sounds normal. No stridor. distress, wheezes or  Rales. Heart:normal rate, regular rhythm, normal S1, S2, no murmurs, rubs, clicks or gallops , PMI non displaced. Edema: Edema is none. Extremities:  no clubbing or cyanosis. Abdominal:  no abnormal distension. Neurological: alert, conversant and oriented . Skin: Skin is not cold. No obvious systemic rash noted. Not diaphoretic. No erythema. Psychiatric:  Grossly normal mood and affect.   Behavior appears normal.     Lab Results   Component Value Date/Time    Cholesterol, total 163 01/24/2017 09:00 AM    HDL Cholesterol 39 (L) 01/24/2017 09:00 AM    LDL, calculated 95 01/24/2017 09:00 AM    Triglyceride 146 01/24/2017 09:00 AM     Lab Results   Component Value Date/Time    Sodium 140 06/15/2018 08:51 AM    Potassium 4.3 06/15/2018 08:51 AM    Chloride 106 06/15/2018 08:51 AM    CO2 26 06/15/2018 08:51 AM    Anion gap 8 06/15/2018 08:51 AM    Glucose 238 (H) 06/15/2018 08:51 AM    BUN 13 06/15/2018 08:51 AM    Creatinine 0.88 06/15/2018 08:51 AM    BUN/Creatinine ratio 15 06/15/2018 08:51 AM    GFR est AA >60 06/15/2018 08:51 AM    GFR est non-AA >60 06/15/2018 08:51 AM    Calcium 8.9 06/15/2018 08:51 AM      Wt Readings from Last 3 Encounters:   11/13/19 224 lb (101.6 kg)   06/13/19 232 lb (105.2 kg)   06/13/19 232 lb (105.2 kg)      BP Readings from Last 3 Encounters:   11/13/19 140/72   06/13/19 135/70   05/08/19 134/74        Current Outpatient Medications   Medication Sig    prasugrel (EFFIENT) 10 mg tablet Take 1 Tab by mouth daily.  atenolol (TENORMIN) 100 mg tablet Take 1 Tab by mouth every evening.  enalapril (VASOTEC) 10 mg tablet TAKE 1 TABLET TWICE DAILY.  amLODIPine (NORVASC) 5 mg tablet Take 1 Tab by mouth daily.  nitroglycerin (NITRODUR) 0.2 mg/hr 1 Patch by TransDERmal route daily. Indications: Chronic Stable Angina Pectoris    nitroglycerin (NITROSTAT) 0.4 mg SL tablet 1 Tab by SubLINGual route every five (5) minutes as needed for Chest Pain.  metFORMIN ER (GLUCOPHAGE XR) 500 mg tablet TAKE 1 TABLET BEFORE BREAKFAST & DINNER    atorvastatin (LIPITOR) 20 mg tablet TAKE 1 TABLET NIGHTLY.  aspirin delayed-release 81 mg tablet Take  by mouth daily.  glucose blood VI test strips (BLOOD GLUCOSE TEST) strip Check blood sugar once daily    Blood-Glucose Meter monitoring kit Please dispense one Contour meter -diagnosis code 250.00- Check BS once daily.  Lancets misc Check BS once daily    isosorbide mononitrate ER (IMDUR) 30 mg tablet Take 1 Tab by mouth daily. No current facility-administered medications for this visit. Past Surgical History:   Procedure Laterality Date    CARDIAC SURG PROCEDURE UNLIST      HX ORTHOPAEDIC        Social Hx=  reports that he has quit smoking. He has never used smokeless tobacco. He reports that he does not drink alcohol or use drugs. Family Hx= family history includes Diabetes in his mother; Heart Disease in his father; Stroke in his father. Impression see above.

## 2019-11-13 NOTE — PROGRESS NOTES
1. Have you been to the ER, urgent care clinic since your last visit? Hospitalized since your last visit? No    2. Have you seen or consulted any other health care providers outside of the [address] [place] [address] since your last visit? Include any pap smears or colon screening.  No  Reviewed record in preparation for visit and have necessary documentation  Pt did not bring medication to office visit for review    Goals that were addressed and/or need to be completed during or after this appointment include   Health Maintenance Due   Topic Date Due    DTaP/Tdap/Td series (1 - Tdap) 04/20/1975    Shingrix Vaccine Age 50> (1 of 2) 04/20/2004    EYE EXAM RETINAL OR DILATED  12/05/2016    MEDICARE YEARLY EXAM  03/14/2018    FOOT EXAM Q1  07/19/2018    GLAUCOMA SCREENING Q2Y  04/20/2019    Pneumococcal 65+ years (1 of 1 - PPSV23) 04/20/2019    HEMOGLOBIN A1C Q6M  06/08/2019    Influenza Age 9 to Adult  08/01/2019    MICROALBUMIN Q1  12/08/2019    LIPID PANEL Q1  12/08/2019

## 2020-01-22 DIAGNOSIS — I25.709 CORONARY ARTERY DISEASE INVOLVING CORONARY BYPASS GRAFT OF NATIVE HEART WITH ANGINA PECTORIS (HCC): ICD-10-CM

## 2020-01-22 RX ORDER — NITROGLYCERIN 0.4 MG/1
0.4 TABLET SUBLINGUAL
Qty: 1 BOTTLE | Refills: 1 | Status: SHIPPED | OUTPATIENT
Start: 2020-01-22

## 2020-01-22 NOTE — TELEPHONE ENCOUNTER
Request for nitro PRN. Last office visit 11-13-19, next office visit 11-11-20.  Refills per verbal order from Dr. Wei Hernandez.

## 2020-02-12 ENCOUNTER — OFFICE VISIT (OUTPATIENT)
Dept: CARDIOLOGY CLINIC | Age: 65
End: 2020-02-12

## 2020-02-12 VITALS
TEMPERATURE: 98.5 F | DIASTOLIC BLOOD PRESSURE: 66 MMHG | RESPIRATION RATE: 20 BRPM | SYSTOLIC BLOOD PRESSURE: 151 MMHG | WEIGHT: 223 LBS | OXYGEN SATURATION: 97 % | BODY MASS INDEX: 33.03 KG/M2 | HEIGHT: 69 IN | HEART RATE: 80 BPM

## 2020-02-12 DIAGNOSIS — I25.709 CORONARY ARTERY DISEASE INVOLVING CORONARY BYPASS GRAFT OF NATIVE HEART WITH ANGINA PECTORIS (HCC): Primary | ICD-10-CM

## 2020-02-12 DIAGNOSIS — E78.00 HYPERCHOLESTEREMIA: ICD-10-CM

## 2020-02-12 DIAGNOSIS — Z95.1 S/P CABG X 3: ICD-10-CM

## 2020-02-12 DIAGNOSIS — E11.9 TYPE 2 DIABETES MELLITUS WITHOUT COMPLICATION, WITHOUT LONG-TERM CURRENT USE OF INSULIN (HCC): ICD-10-CM

## 2020-02-12 DIAGNOSIS — I25.708 CORONARY ARTERY DISEASE OF BYPASS GRAFT OF NATIVE HEART WITH STABLE ANGINA PECTORIS (HCC): ICD-10-CM

## 2020-02-12 DIAGNOSIS — I10 ESSENTIAL HYPERTENSION: ICD-10-CM

## 2020-02-12 NOTE — PROGRESS NOTES
JOSE A MARKER      1955   Derrick Sanchez MD  Date of Visit-2/12/2020     [facility],  PCP=Joyce Moore,      [facility] [address] [place] [address] [facility] [place]. HPI:   Krista Buckner is a 71 y.o. male   As often the case a bit tangential  Notes chest pain under breast and rib margin bilateral  Says it happens at night only then says happens with exertion and Then says it occurs with exertion last about 1 or 2 minutes sometimes to 3 minutes it does not feel like the pain he had before his prior RCA stent in June 2018.   He wonders if it is arthritis or related to prior surgery     · he says he does not want  to have anymore procedures and then hands a packet of papers  he got it he was in the hospital   · he says he had the pain before and after this but it looks like he was admitted to [facility] with pneumonia on January 25   · he had been having intermittent chest pain lasting several minutes sharp and dull found to have phlegm congestion and found to have pneumonia   · he was admitted to [facility] and transferred to [facility]   · I have records from [facility] basically his H&P and initial labs which showed a normal creatinine and troponin but I do not have any further records  · there was concern for bilateral airspace disease but not confirmed and looks like he was placed on prednisone and Levaquin   · I do not know what happened to [facility]       prior CABG 2014 had PCI of mid RCA 90% lesion 6/15/18.    Non compliant with  Norvasc for HTN but does not check BP and non compliant with bp diary  [facility] with small reversible and fixed areas where RCA stent done     CABG 11-12-14= LIMA to LAD, SVG to [address] PCI June 2018 to mid RCA 90% open grafts to OMs and LIMA, still a waist at the RCA prox stent edge   Saw Dr Katie Vásquez, via [facility] I can see there was CT chest -9 with   \"Unchanged nodule in the left lower lobe is noted measuring 1.6 x 1.1 cm\"   Previously the concern was abnormal PET imaging  Prior cath done for similar symptoms 7/14/2018  Showed EF 55%  LAD proximal 40 circumflex proximal 75 circumflex mid 75 OM1 50 proximal RCA 50 mid RCA 90 patient underwent IVY in the mid RCA with a good result    Assessment/Plans:  1. Coronary artery disease involving coronary bypass graft of native heart with angina pectoris ([facility]  DECLINES any further testing or medication changes   In talking him this pain seems to me to be not consistent with his prior known angina   it occurred around the time of his pneumonia has persisted it may be musculoskeletal he has has not have any testing done very negative towards any interventions. At lease he should try Tums or Mylanta offered to do a stress test or cath again if his pain is not explained otherwise but I do not really think it is at this point typical of CAD and without troponin was normal   Hope to get records from [facility]   he will call me back if things are improved or not with trying GI GERD medicines        2. Coronary artery disease of bypass graft of native heart with stable angina pectoris ([facility] [address]  CABG 11-12-14= LIMA to LAD, SVG to OM1,OM2    Cath 11/11/14 -pre CABG  RCA with patent stent  mLCX 80, OM1 mid 80   LAD- 85 tubular at ostium, mid 80 EF60%    [place] PCI June 2018 to mid RCA 90% open grafts to OMs and LIMA, still a waist at the RCA prox stent edge   3. Essential hypertension  NOT At goal , meds and possible side effects reviewed and patient denies  Key CAD CHF Meds             nitroglycerin (NITROSTAT) 0.4 mg SL tablet (Taking) 1 Tab by SubLINGual route every five (5) minutes as needed for Chest Pain. Do not exceed 3 doses. prasugrel (EFFIENT) 10 mg tablet (Taking) Take 1 Tab by mouth daily. atenolol (TENORMIN) 100 mg tablet (Taking) Take 1 Tab by mouth every evening. enalapril (VASOTEC) 10 mg tablet (Taking) TAKE 1 TABLET TWICE DAILY.     amLODIPine (NORVASC) 5 mg tablet (Taking) Take 1 Tab by mouth daily. nitroglycerin (NITRODUR) 0.2 mg/hr (Taking) 1 Patch by TransDERmal route daily. Indications: Chronic Stable Angina Pectoris    atorvastatin (LIPITOR) 20 mg tablet (Taking) TAKE 1 TABLET NIGHTLY. aspirin delayed-release 81 mg tablet (Taking) Take  by mouth daily. isosorbide mononitrate ER (IMDUR) 30 mg tablet Take 1 Tab by mouth daily. BP Readings from Last 6 Encounters:   02/12/20 151/66   11/13/19 140/72   06/13/19 135/70   05/08/19 134/74   11/14/18 154/82   08/15/18 142/82          4. Hypercholesteremia  Key Antihyperlipidemia Meds             atorvastatin (LIPITOR) 20 mg tablet (Taking) TAKE 1 TABLET NIGHTLY. NOT at goal , denies excess muscle aches or new liver issues  Lab Results   Component Value Date/Time    LDL, calculated 95 01/24/2017 09:00 AM         5. S/P CABG x 3   6. Type 2 diabetes mellitus without complication, without long-term current use of insulin (HCC)  Main ejection fraction not good on diet or weight loss  Body mass index is 32.93 kg/m². Future Appointments   Date Time Provider Stephanie Javier   11/11/2020  1:00 PM Radhika Garber MD [address] [place] [address] complete  as above. Respiratory as above with no wheezing or hemoptysis. He denies  symptoms of unusual weight loss , fevers,  BRBPR, hematuria,  or recent stroke    Past Medical History:   Diagnosis Date    CAD (coronary artery disease)     stent to [facility];CABG 11-12-14    DM2 (diabetes mellitus, type 2) (HCC)     Hypercholesteremia     Hypertension       Exam and Labs:  Visit Vitals  /66 (BP 1 Location: Left arm, BP Patient Position: Sitting)   Pulse 80   Temp 98.5 °F (36.9 °C) (Oral)   Resp 20   Ht 5' 9\" (1.753 m)   Wt 223 lb (101.2 kg)   SpO2 97%   BMI 32.93 kg/m²     Constitutional:  NAD, comfortable , moist mucous membranesHENT: Head: NC,ATEyes: No scleral icterus. Neck:  Neck supple. No JVD present.  No tracheal deviation,mass Chest: Effort normal & normal respiratory excursion     Lungs:    No rales/wheezes ; breath sounds normal. No stridor or  distress. Heart:    normal rate, regular rhythm, normal S1, S2, no murmurs, rubs, clicks or gallops , PMI non displaced. Edema: Edema is none. Extremities:  no clubbing or cyanosis. Abdominal:  no abnormal distension. Neurological: alert, conversant and oriented . Skin: Skin is not cold. No obvious systemic rash noted. Not diaphoretic. No erythema. Psychiatric:  Grossly normal mood and affect. Behavior appears normal.     Lab Results   Component Value Date/Time    Cholesterol, total 163 01/24/2017 09:00 AM    HDL Cholesterol 39 (L) 01/24/2017 09:00 AM    LDL, calculated 95 01/24/2017 09:00 AM    Triglyceride 146 01/24/2017 09:00 AM     Lab Results   Component Value Date/Time    Sodium 140 06/15/2018 08:51 AM    Potassium 4.3 06/15/2018 08:51 AM    Chloride 106 06/15/2018 08:51 AM    CO2 26 06/15/2018 08:51 AM    Anion gap 8 06/15/2018 08:51 AM    Glucose 238 (H) 06/15/2018 08:51 AM    BUN 13 06/15/2018 08:51 AM    Creatinine 0.88 06/15/2018 08:51 AM    BUN/Creatinine ratio 15 06/15/2018 08:51 AM    GFR est AA >60 06/15/2018 08:51 AM    GFR est non-AA >60 06/15/2018 08:51 AM    Calcium 8.9 06/15/2018 08:51 AM      Wt Readings from Last 3 Encounters:   02/12/20 223 lb (101.2 kg)   11/13/19 224 lb (101.6 kg)   06/13/19 232 lb (105.2 kg)      BP Readings from Last 3 Encounters:   02/12/20 151/66   11/13/19 140/72   06/13/19 135/70        Current Outpatient Medications   Medication Sig    nitroglycerin (NITROSTAT) 0.4 mg SL tablet 1 Tab by SubLINGual route every five (5) minutes as needed for Chest Pain. Do not exceed 3 doses.  prasugrel (EFFIENT) 10 mg tablet Take 1 Tab by mouth daily.  atenolol (TENORMIN) 100 mg tablet Take 1 Tab by mouth every evening.  enalapril (VASOTEC) 10 mg tablet TAKE 1 TABLET TWICE DAILY.  amLODIPine (NORVASC) 5 mg tablet Take 1 Tab by mouth daily.  nitroglycerin (NITRODUR) 0.2 mg/hr 1 Patch by TransDERmal route daily. Indications: Chronic Stable Angina Pectoris    metFORMIN ER (GLUCOPHAGE XR) 500 mg tablet TAKE 1 TABLET BEFORE BREAKFAST & DINNER    atorvastatin (LIPITOR) 20 mg tablet TAKE 1 TABLET NIGHTLY.  aspirin delayed-release 81 mg tablet Take  by mouth daily.  glucose blood VI test strips (BLOOD GLUCOSE TEST) strip Check blood sugar once daily    Blood-Glucose Meter monitoring kit Please dispense one Contour meter -diagnosis code 250.00- Check BS once daily.  Lancets misc Check BS once daily    isosorbide mononitrate ER (IMDUR) 30 mg tablet Take 1 Tab by mouth daily. No current facility-administered medications for this visit. Past Surgical History:   Procedure Laterality Date    CARDIAC SURG PROCEDURE UNLIST      HX ORTHOPAEDIC        Social Hx=  reports that he has quit smoking. He has never used smokeless tobacco. He reports that he does not drink alcohol or use drugs. Family Hx= family history includes Diabetes in his mother; Heart Disease in his father; Stroke in his father. Impression see above.

## 2020-02-12 NOTE — Clinical Note
2/25/20 Patient: Johnny Mejia YOB: 1955 Date of Visit: 2/12/2020 Adenike Cifuentes DO 
[address] [address] VIA Facsimile: [phone] Dear Adenike Cifuentes DO, Thank you for referring Mr. Ramsey Hernandez to [facility] for evaluation. My notes for this consultation are attached. If you have questions, please do not hesitate to call me. I look forward to following your patient along with you.  
 
 
Sincerely, 
 
Jacinto Altamirano MD

## 2020-02-12 NOTE — PROGRESS NOTES
1. Have you been to the ER, urgent care clinic since your last visit? Hospitalized since your last visit? No    2. Have you seen or consulted any other health care providers outside of the [address] [place] [address] since your last visit? Include any pap smears or colon screening.  No  Reviewed record in preparation for visit and have necessary documentation  Pt did not bring medication to office visit for review  Information was given to pt on Advanced Directives, Living Will  Information was given on Shingles Vaccine  opportunity was given for questions  Goals that were addressed and/or need to be completed during or after this appointment include

## 2020-07-21 ENCOUNTER — TELEPHONE (OUTPATIENT)
Dept: CARDIOLOGY CLINIC | Age: 65
End: 2020-07-21

## 2020-07-21 NOTE — TELEPHONE ENCOUNTER
Verified patient with two types of identifiers. Spoke to patient's daughter Komal Tompkins (on HIPAA), she reports patient would like to be seen by Dr. Roberta Powell. Will schedule with [facility].

## 2020-07-22 ENCOUNTER — OFFICE VISIT (OUTPATIENT)
Dept: CARDIOLOGY CLINIC | Age: 65
End: 2020-07-22

## 2020-07-22 VITALS
OXYGEN SATURATION: 97 % | SYSTOLIC BLOOD PRESSURE: 155 MMHG | BODY MASS INDEX: 33.33 KG/M2 | HEIGHT: 69 IN | DIASTOLIC BLOOD PRESSURE: 81 MMHG | HEART RATE: 69 BPM | TEMPERATURE: 97.7 F | RESPIRATION RATE: 20 BRPM | WEIGHT: 225 LBS

## 2020-07-22 DIAGNOSIS — E78.00 HYPERCHOLESTEREMIA: ICD-10-CM

## 2020-07-22 DIAGNOSIS — I25.709 CORONARY ARTERY DISEASE INVOLVING CORONARY BYPASS GRAFT OF NATIVE HEART WITH ANGINA PECTORIS (HCC): Primary | ICD-10-CM

## 2020-07-22 DIAGNOSIS — I10 ESSENTIAL HYPERTENSION: ICD-10-CM

## 2020-07-22 DIAGNOSIS — Z95.1 S/P CABG X 3: ICD-10-CM

## 2020-07-22 DIAGNOSIS — R06.09 DOE (DYSPNEA ON EXERTION): ICD-10-CM

## 2020-07-22 NOTE — PROGRESS NOTES
Jose Johnson Marker     1955       Palma Truong MD, [facility]  Date of Visit-7/22/2020   PCP is DO Diana [facility] [place]  [facility]  Virtual Visit  HPI:  Inocencio Reynolds is a 76 y.o. male   who was seen by synchronous (real-time) audio-video technology on 7/22/2020. [facility] access [facility] in [facility] family practice office and I seen remotely  Fu of  Chronic tangential historian previously on visit in February noted chest pain of the breast and right mid rib margin  Today  his prior RCA stent in June 2018. CAD brought in by daughters. Even with family there remains a poor historian minimizing symptoms. Family reports he has had some episodes of chest pain 2-3 times. Previous hx or visit:      CABG 11-12-14= LIMA to LAD, SVG to OM1,OM2    Cath 11/11/14 -pre CABG  RCA with patent stent  mLCX 80, OM1 mid 80   LAD- 85 tubular at ostium, mid 80 EF60%    [place] PCI June 2018 to mid RCA 90% open grafts to OMs and LIMA, still a waist at the RCA prox stent edge  Assessment/Plan:     1. CAD with CABG  Unclear if he is having current angina though I think this I am suspicious for this I recommended further testing but he declines but does agree to follow-up in 1 week  2. Shortness of breath may have an element of either ischemia or diastolic dysfunction  Start Bumex 1 mg daily for 3 days then go to every other day  Do tele visit in one week-next Thursday    3. Dyslipidemia on high potency statin  4. Diabetes main cardiovascular risk factor  5. Hypertension urged to keep diary  This note was created using voice recognition software. Despite editing, there may be syntax errors. Key CAD CHF Meds             nitroglycerin (NITROSTAT) 0.4 mg SL tablet (Taking) 1 Tab by SubLINGual route every five (5) minutes as needed for Chest Pain. Do not exceed 3 doses.     prasugrel (EFFIENT) 10 mg tablet (Taking) Take 1 Tab by mouth daily.    atenolol (TENORMIN) 100 mg tablet (Taking) Take 1 Tab by mouth every evening. enalapril (VASOTEC) 10 mg tablet (Taking) TAKE 1 TABLET TWICE DAILY. amLODIPine (NORVASC) 5 mg tablet (Taking) Take 1 Tab by mouth daily. nitroglycerin (NITRODUR) 0.2 mg/hr (Taking) 1 Patch by TransDERmal route daily. Indications: Chronic Stable Angina Pectoris    atorvastatin (LIPITOR) 20 mg tablet (Taking) TAKE 1 TABLET NIGHTLY. aspirin delayed-release 81 mg tablet (Taking) Take  by mouth daily. isosorbide mononitrate ER (IMDUR) 30 mg tablet Take 1 Tab by mouth daily. ROS-except as noted above. . A complete cardiac and respiratory are reviewed and negative except as above ; Resp-denies wheezing  or productive cough,. Const- No unusual weight loss or fever; Neuro-no recent seizure or CVA ; GI- No BRBPR, abdom pain, bloating ; - no  hematuria   Past Medical History:   Diagnosis Date    CAD (coronary artery disease)     stent to [facility];CABG 11-12-14    DM[facility], type 2) [facility]     Hypercholesteremia     Hypertension       Social Hx= reports that he has quit smoking. He has never used smokeless tobacco. He reports that he does not drink alcohol or use drugs. Due to this being a TeleHealth evaluation, many elements of the physical examination are unable to be assessed. Vitals if sent, or see HPI    Visit Vitals  /81 (BP 1 Location: Right arm, BP Patient Position: Sitting)   Pulse 69   Temp 97.7 °F (36.5 °C) (Oral)   Resp 20   Ht 5' 9\" (1.753 m)   Wt 225 lb (102.1 kg)   SpO2 97%   BMI 33.23 kg/m²      General: Well developed, in no acute distress, cooperative and alert  HEENT: Pupils equal/round. No marked JVD visible on video. Respiratory: No audible wheezing, no signs of respiratory distress, lips non cyanotic  Extremities:  No edema  Neuro: A&Ox3, speech clear, no facial droop, answering questions appropriately  Skin: Skin color is normal. No rashes or lesions.  Non diaphoretic on visible skin during exam  Psych: mood and affect are appropriate and pleasant    Lab Results   Component Value Date/Time    Cholesterol, total 163 01/24/2017 09:00 AM    HDL Cholesterol 39 (L) 01/24/2017 09:00 AM    LDL, calculated 95 01/24/2017 09:00 AM    Triglyceride 146 01/24/2017 09:00 AM     Lab Results   Component Value Date/Time    Sodium 140 06/15/2018 08:51 AM    Potassium 4.3 06/15/2018 08:51 AM    Chloride 106 06/15/2018 08:51 AM    CO2 26 06/15/2018 08:51 AM    Anion gap 8 06/15/2018 08:51 AM    Glucose 238 (H) 06/15/2018 08:51 AM    BUN 13 06/15/2018 08:51 AM    Creatinine 0.88 06/15/2018 08:51 AM    BUN/Creatinine ratio 15 06/15/2018 08:51 AM    GFR est AA >60 06/15/2018 08:51 AM    GFR est non-AA >60 06/15/2018 08:51 AM    Calcium 8.9 06/15/2018 08:51 AM      Wt Readings from Last 3 Encounters:   07/22/20 225 lb (102.1 kg)   02/12/20 223 lb (101.2 kg)   11/13/19 224 lb (101.6 kg)      BP Readings from Last 3 Encounters:   07/22/20 155/81   02/12/20 151/66   11/13/19 140/72        Current Outpatient Medications   Medication Sig    nitroglycerin (NITROSTAT) 0.4 mg SL tablet 1 Tab by SubLINGual route every five (5) minutes as needed for Chest Pain. Do not exceed 3 doses.  prasugrel (EFFIENT) 10 mg tablet Take 1 Tab by mouth daily.  atenolol (TENORMIN) 100 mg tablet Take 1 Tab by mouth every evening.  enalapril (VASOTEC) 10 mg tablet TAKE 1 TABLET TWICE DAILY.  amLODIPine (NORVASC) 5 mg tablet Take 1 Tab by mouth daily.  nitroglycerin (NITRODUR) 0.2 mg/hr 1 Patch by TransDERmal route daily. Indications: Chronic Stable Angina Pectoris    metFORMIN ER (GLUCOPHAGE XR) 500 mg tablet TAKE 1 TABLET BEFORE BREAKFAST & DINNER    atorvastatin (LIPITOR) 20 mg tablet TAKE 1 TABLET NIGHTLY.  aspirin delayed-release 81 mg tablet Take  by mouth daily.     glucose blood VI test strips (BLOOD GLUCOSE TEST) strip Check blood sugar once daily    Lancets misc Check BS once daily    isosorbide mononitrate ER (IMDUR) 30 mg tablet Take 1 Tab by mouth daily.  Blood-Glucose Meter monitoring kit Please dispense one Contour meter -diagnosis code 250.00- Check BS once daily. No current facility-administered medications for this visit. Impression see above. VIRTUAL VISIT DOCUMENTATION   Pursuant to the emergency declaration under the [facility] [place] [address] [facility] authority and the [employer] Act, this Virtual  Visit was conducted, with patient's consent, to reduce the patient's risk of exposure to COVID-19 and provide continuity of care for an established patient. Services were provided through a video synchronous discussion virtually to substitute for in-person clinic visit. We discussed the expected course, resolution and complications of the diagnosis(es) in detail. Medication risks, benefits, costs, interactions, and alternatives were discussed as indicated. I advised him to contact the office if his condition worsens, changes or fails to improve as anticipated. He expressed understanding with the diagnosis(es) and plan  I have reviewed the nurses notes, vitals, problem list, allergy list, medical history, family, social history and medications. FOLLOW-UP   Patient was made aware and verbalized understanding that an appointment will be scheduled for them for a virtual visit and/or office visit within the above time frame. Patient understanding his/her responsibility to call and change time/date if he/she so chooses. MD [facility].  [address] [place] [address], [address], [facility]  [phone] / [phone] Fax  [phone] / [phone] Fax  This visit was conducted using [employer] telemedicine services or similar service.

## 2020-07-22 NOTE — TELEPHONE ENCOUNTER
Verified patient with two types of identifiers. Spoke with Arthur Blair at [facility] and they are fine with a 1430 Intouch appointment. Let Christie know. Patient's daughter verbalized understanding and will call with any other questions.

## 2021-03-22 ENCOUNTER — HOSPITAL ENCOUNTER (EMERGENCY)
Age: 66
Discharge: HOME | End: 2021-03-22
Attending: EMERGENCY MEDICINE | Admitting: EMERGENCY MEDICINE
Payer: MEDICARE

## 2021-03-22 ENCOUNTER — APPOINTMENT (OUTPATIENT)
Dept: CT IMAGING | Age: 66
End: 2021-03-22
Attending: EMERGENCY MEDICINE
Payer: MEDICARE

## 2021-03-22 VITALS
BODY MASS INDEX: 33.77 KG/M2 | HEART RATE: 89 BPM | SYSTOLIC BLOOD PRESSURE: 158 MMHG | TEMPERATURE: 97.7 F | RESPIRATION RATE: 16 BRPM | DIASTOLIC BLOOD PRESSURE: 86 MMHG | OXYGEN SATURATION: 97 % | HEIGHT: 69 IN | WEIGHT: 228 LBS

## 2021-03-22 DIAGNOSIS — M54.16 LUMBAR RADICULOPATHY: Primary | ICD-10-CM

## 2021-03-22 LAB
ALBUMIN SERPL-MCNC: 3.7 G/DL (ref 3.5–5)
ALBUMIN/GLOB SERPL: 1.1 {RATIO} (ref 1.1–2.2)
ALP SERPL-CCNC: 149 U/L (ref 45–117)
ALT SERPL-CCNC: 40 U/L (ref 12–78)
ANION GAP SERPL CALC-SCNC: 4 MMOL/L (ref 5–15)
APPEARANCE UR: CLEAR
AST SERPL-CCNC: 24 U/L (ref 15–37)
BASOPHILS # BLD: 0.1 K/UL (ref 0–0.1)
BASOPHILS NFR BLD: 1 % (ref 0–1)
BILIRUB SERPL-MCNC: 0.2 MG/DL (ref 0.2–1)
BILIRUB UR QL: NEGATIVE
BUN SERPL-MCNC: 9 MG/DL (ref 6–20)
BUN/CREAT SERPL: 10 (ref 12–20)
CALCIUM SERPL-MCNC: 9.2 MG/DL (ref 8.5–10.1)
CHLORIDE SERPL-SCNC: 105 MMOL/L (ref 97–108)
CO2 SERPL-SCNC: 29 MMOL/L (ref 21–32)
COLOR UR: ABNORMAL
COMMENT, HOLDF: NORMAL
CREAT SERPL-MCNC: 0.94 MG/DL (ref 0.7–1.3)
DIFFERENTIAL METHOD BLD: ABNORMAL
EOSINOPHIL # BLD: 0.2 K/UL (ref 0–0.4)
EOSINOPHIL NFR BLD: 3 % (ref 0–7)
ERYTHROCYTE [DISTWIDTH] IN BLOOD BY AUTOMATED COUNT: 13.7 % (ref 11.5–14.5)
GLOBULIN SER CALC-MCNC: 3.4 G/DL (ref 2–4)
GLUCOSE SERPL-MCNC: 182 MG/DL (ref 65–100)
GLUCOSE UR STRIP.AUTO-MCNC: >1000 MG/DL
HCT VFR BLD AUTO: 41.5 % (ref 36.6–50.3)
HGB BLD-MCNC: 13.8 G/DL (ref 12.1–17)
HGB UR QL STRIP: NEGATIVE
IMM GRANULOCYTES # BLD AUTO: 0.1 K/UL (ref 0–0.04)
IMM GRANULOCYTES NFR BLD AUTO: 1 % (ref 0–0.5)
KETONES UR QL STRIP.AUTO: NEGATIVE MG/DL
LEUKOCYTE ESTERASE UR QL STRIP.AUTO: NEGATIVE
LYMPHOCYTES # BLD: 1.9 K/UL (ref 0.8–3.5)
LYMPHOCYTES NFR BLD: 21 % (ref 12–49)
MCH RBC QN AUTO: 29.7 PG (ref 26–34)
MCHC RBC AUTO-ENTMCNC: 33.3 G/DL (ref 30–36.5)
MCV RBC AUTO: 89.4 FL (ref 80–99)
MONOCYTES # BLD: 0.8 K/UL (ref 0–1)
MONOCYTES NFR BLD: 8 % (ref 5–13)
NEUTS SEG # BLD: 6.1 K/UL (ref 1.8–8)
NEUTS SEG NFR BLD: 66 % (ref 32–75)
NITRITE UR QL STRIP.AUTO: NEGATIVE
NRBC # BLD: 0 K/UL (ref 0–0.01)
NRBC BLD-RTO: 0 PER 100 WBC
PH UR STRIP: 6.5 [PH] (ref 5–8)
PLATELET # BLD AUTO: 199 K/UL (ref 150–400)
PMV BLD AUTO: 11.7 FL (ref 8.9–12.9)
POTASSIUM SERPL-SCNC: 4.2 MMOL/L (ref 3.5–5.1)
PROT SERPL-MCNC: 7.1 G/DL (ref 6.4–8.2)
PROT UR STRIP-MCNC: NEGATIVE MG/DL
RBC # BLD AUTO: 4.64 M/UL (ref 4.1–5.7)
SAMPLES BEING HELD,HOLD: NORMAL
SODIUM SERPL-SCNC: 138 MMOL/L (ref 136–145)
SP GR UR REFRACTOMETRY: 1.03 (ref 1–1.03)
UR CULT HOLD, URHOLD: NORMAL
UROBILINOGEN UR QL STRIP.AUTO: 0.2 EU/DL (ref 0.2–1)
WBC # BLD AUTO: 9.1 K/UL (ref 4.1–11.1)

## 2021-03-22 PROCEDURE — 99282 EMERGENCY DEPT VISIT SF MDM: CPT

## 2021-03-22 PROCEDURE — 85025 COMPLETE CBC W/AUTO DIFF WBC: CPT

## 2021-03-22 PROCEDURE — 36415 COLL VENOUS BLD VENIPUNCTURE: CPT

## 2021-03-22 PROCEDURE — 72131 CT LUMBAR SPINE W/O DYE: CPT

## 2021-03-22 PROCEDURE — 81003 URINALYSIS AUTO W/O SCOPE: CPT

## 2021-03-22 PROCEDURE — 80053 COMPREHEN METABOLIC PANEL: CPT

## 2021-03-22 RX ORDER — METHOCARBAMOL 750 MG/1
750 TABLET, FILM COATED ORAL 4 TIMES DAILY
Qty: 20 TAB | Refills: 0 | Status: SHIPPED | OUTPATIENT
Start: 2021-03-22

## 2021-03-22 NOTE — DISCHARGE INSTRUCTIONS
Please schedule an appointment with orthopedic surgeon for further evaluation. If you develop any new or concerning symptoms please return to ER for further evaluation.

## 2021-03-22 NOTE — ED TRIAGE NOTES
Pt reports right lower back, hip, and right leg after \"slinging a bucket 2 days\". Pt also reports he is not supposed to be lifting things. Pt has hx of 4 back surgeries.

## 2021-03-22 NOTE — ED PROVIDER NOTES
Patient is a 74-year-old male with a past medical history of coronary artery disease status post CABG, type 2 diabetes, hypercholesterol, hypertension and chronic back pain who presents to ED complaining of low back pain which has been ongoing and was recently exacerbated. Patient reports he has had prior surgery on his back and that he has been doing heavy lifting for his work and recently exacerbated his pain. Patient reports he was seen last week in [place] by a pain management specialist and given hydrocodone and advised to take Tylenol. Patient reports the hydrocodone makes him tired but does not actually treat his pain. Patient reports pain is to low back and radiates down his right lateral leg to above the knee. Patient reports pain is exacerbated by movement and lying in certain positions. Patient reports he has not yet been seen by an orthopedic. Patient denies weakness in the legs, urinary retention, incontinence of bowel or bladder, perineal numbness, fever, gait disturbance, or history of IV drug abuse. No long-term steroid use.            Past Medical History:   Diagnosis Date    CAD (coronary artery disease)     stent to [facility];CABG 11-12-14    DM2 (diabetes mellitus, type 2) ([facility])     Hypercholesteremia     Hypertension        Past Surgical History:   Procedure Laterality Date    CARDIAC SURG PROCEDURE UNLIST      HX ORTHOPAEDIC           Family History:   Problem Relation Age of Onset    Diabetes Mother     Heart Disease Father     Stroke Father        Social History     Socioeconomic History    Marital status:      Spouse name: Not on file    Number of children: Not on file    Years of education: Not on file    Highest education level: Not on file   Occupational History    Not on file   Social Needs    Financial resource strain: Not on file    Food insecurity     Worry: Not on file     Inability: Not on file    Transportation needs     Medical: Not on file     Non-medical: Not on file   Tobacco Use    Smoking status: Former Smoker    Smokeless tobacco: Never Used   Substance and Sexual Activity    Alcohol use: No    Drug use: No    Sexual activity: Not on file   Lifestyle    Physical activity     Days per week: Not on file     Minutes per session: Not on file    Stress: Not on file   Relationships    Social connections     Talks on phone: Not on file     Gets together: Not on file     Attends Church service: Not on file     Active member of club or organization: Not on file     Attends meetings of clubs or organizations: Not on file     Relationship status: Not on file    Intimate partner violence     Fear of current or ex partner: Not on file     Emotionally abused: Not on file     Physically abused: Not on file     Forced sexual activity: Not on file   Other Topics Concern    Not on file   Social History Narrative    Not on file         ALLERGIES: Oxycodone-acetaminophen    Review of Systems   Constitutional: Negative for chills and fever. HENT: Negative for congestion and sore throat. Eyes: Negative for pain and discharge. Respiratory: Negative for cough and shortness of breath. Cardiovascular: Negative for chest pain. Gastrointestinal: Negative for abdominal pain, diarrhea, nausea and vomiting. Genitourinary: Negative for difficulty urinating, dysuria, enuresis and urgency. Musculoskeletal: Positive for back pain and gait problem. Negative for myalgias, neck pain and neck stiffness. Skin: Negative for rash. Allergic/Immunologic: Negative for immunocompromised state. Neurological: Negative for dizziness, syncope, facial asymmetry, weakness and headaches. Psychiatric/Behavioral: Negative for confusion. All other systems reviewed and are negative.       Vitals:    03/22/21 1404 03/22/21 1635   BP: (!) 194/97 (!) 158/86   Pulse: 89    Resp: 16    Temp: 97.7 °F (36.5 °C)    SpO2: 97%    Weight: 103.4 kg (228 lb)    Height: 5' 9\" (1.753 m)             Physical Exam  Vitals signs and nursing note reviewed. Constitutional:       Appearance: Normal appearance. He is well-developed. HENT:      Head: Normocephalic and atraumatic. Nose: Nose normal.      Mouth/Throat:      Mouth: Mucous membranes are moist.   Eyes:      General: Lids are normal.      Extraocular Movements: Extraocular movements intact. Conjunctiva/sclera: Conjunctivae normal.   Neck:      Musculoskeletal: Normal range of motion and neck supple. Cardiovascular:      Rate and Rhythm: Normal rate. Pulses: Normal pulses. Heart sounds: S1 normal and S2 normal.   Pulmonary:      Effort: Pulmonary effort is normal. No accessory muscle usage. Abdominal:      General: Abdomen is flat. Palpations: Abdomen is soft. Musculoskeletal: Normal range of motion. Comments: No tenderness noted in cervical, thoracic or lumbar spine. Tenderness over right SI joint. Straight leg raise positive on the right. SLR negative on left. Ambulates slowly. Neurovascularly intact distally. Distal pulses 2+. Skin:     General: Skin is warm and dry. Capillary Refill: Capillary refill takes less than 2 seconds. Neurological:      General: No focal deficit present. Mental Status: He is alert and oriented to person, place, and time. Mental status is at baseline. Psychiatric:         Attention and Perception: Attention normal.         Mood and Affect: Mood and affect normal.         Speech: Speech normal.         Behavior: Behavior normal. Behavior is cooperative. Thought Content: Thought content normal.         Cognition and Memory: Cognition normal.         Judgment: Judgment normal.          MDM  Number of Diagnoses or Management Options  Lumbar radiculopathy  Diagnosis management comments: Patient presents with exacerbation of chronic back pain.   He was recently seen by pain management and signed a contract in order to start being prescribed oxycodone. He reports when he takes this it just makes him fall asleep but not his pain is not controlled. Given he is on prasugrel and his history of diabetes limited options for treating his pain because he cannot take steroids or any anti-inflammatories. Will recommend patient continue to take Tylenol and give Rx of Robaxin. Patient will need to be seen by orthospine for further evaluation management of his symptoms. Patient and daughter understand agree with plan. Patient will be discharged home.        Amount and/or Complexity of Data Reviewed  Clinical lab tests: reviewed  Tests in the radiology section of CPT®: reviewed  Discuss the patient with other providers: yes (Dr. Clovis Benites, ED Attending )           Procedures

## 2021-08-03 PROBLEM — E11.9 DM2 (DIABETES MELLITUS, TYPE 2) (HCC): Status: RESOLVED | Noted: 2021-08-03 | Resolved: 2021-08-03

## 2021-11-25 LAB
CREATININE, EXTERNAL: 0.84
LDL-C, EXTERNAL: 64

## 2022-03-18 PROBLEM — Z95.820 STATUS POST ANGIOPLASTY WITH STENT: Status: ACTIVE | Noted: 2018-06-15

## 2022-03-18 PROBLEM — I25.709 CORONARY ARTERY DISEASE INVOLVING CORONARY BYPASS GRAFT OF NATIVE HEART WITH ANGINA PECTORIS (HCC): Status: ACTIVE | Noted: 2019-11-13

## 2022-03-18 PROBLEM — R91.1 LUNG NODULE: Status: ACTIVE | Noted: 2019-11-13

## 2022-03-19 PROBLEM — I25.10 CAD (CORONARY ARTERY DISEASE), NATIVE CORONARY ARTERY: Status: ACTIVE | Noted: 2018-06-15

## 2022-03-20 PROBLEM — E11.9 TYPE 2 DIABETES MELLITUS WITHOUT COMPLICATION (HCC): Status: ACTIVE | Noted: 2017-06-05

## 2022-05-14 LAB — SARS-COV-2, NAA: NOT DETECTED

## 2023-04-03 PROBLEM — E11.9 DM2 (DIABETES MELLITUS, TYPE 2) (HCC): Status: RESOLVED | Noted: 2021-08-03 | Resolved: 2021-08-03

## 2023-05-29 RX ORDER — NITROGLYCERIN 40 MG/1
1 PATCH TRANSDERMAL DAILY
COMMUNITY
Start: 2018-06-04

## 2023-05-29 RX ORDER — PRASUGREL 10 MG/1
10 TABLET, FILM COATED ORAL DAILY
COMMUNITY
Start: 2019-03-25

## 2023-05-29 RX ORDER — METHOCARBAMOL 750 MG/1
750 TABLET, FILM COATED ORAL 4 TIMES DAILY
COMMUNITY
Start: 2021-03-22

## 2023-05-29 RX ORDER — METFORMIN HYDROCHLORIDE 500 MG/1
TABLET, EXTENDED RELEASE ORAL
COMMUNITY
Start: 2017-12-21

## 2023-05-29 RX ORDER — ATORVASTATIN CALCIUM 20 MG/1
1 TABLET, FILM COATED ORAL NIGHTLY
COMMUNITY
Start: 2017-11-22

## 2023-05-29 RX ORDER — NITROGLYCERIN 0.4 MG/1
0.4 TABLET SUBLINGUAL
COMMUNITY
Start: 2020-01-22

## 2023-05-29 RX ORDER — ATENOLOL 100 MG/1
100 TABLET ORAL EVERY EVENING
COMMUNITY
Start: 2019-02-19

## 2023-05-29 RX ORDER — ISOSORBIDE MONONITRATE 30 MG/1
30 TABLET, EXTENDED RELEASE ORAL DAILY
COMMUNITY
Start: 2018-07-18

## 2023-05-29 RX ORDER — AMLODIPINE BESYLATE 5 MG/1
5 TABLET ORAL DAILY
COMMUNITY
Start: 2018-08-15

## 2023-05-29 RX ORDER — ENALAPRIL MALEATE 10 MG/1
1 TABLET ORAL 2 TIMES DAILY
COMMUNITY
Start: 2018-12-14

## 2023-05-29 RX ORDER — ASPIRIN 81 MG/1
TABLET ORAL DAILY
COMMUNITY

## 2023-05-29 RX ORDER — LANCETS 30 GAUGE
EACH MISCELLANEOUS
COMMUNITY
Start: 2015-02-13